# Patient Record
Sex: MALE | Race: WHITE | Employment: OTHER | URBAN - METROPOLITAN AREA
[De-identification: names, ages, dates, MRNs, and addresses within clinical notes are randomized per-mention and may not be internally consistent; named-entity substitution may affect disease eponyms.]

---

## 2018-03-22 ENCOUNTER — HOSPITAL ENCOUNTER (OUTPATIENT)
Dept: PREADMISSION TESTING | Age: 73
Discharge: HOME | End: 2018-03-22
Payer: COMMERCIAL

## 2018-03-22 VITALS — WEIGHT: 220 LBS | BODY MASS INDEX: 36.65 KG/M2 | HEIGHT: 65 IN

## 2018-03-22 LAB
ALBUMIN SERPL-MCNC: 3.8 G/DL (ref 3.4–5)
ALBUMIN/GLOB SERPL: 1.3 {RATIO} (ref 0.8–1.7)
ALP SERPL-CCNC: 54 U/L (ref 45–117)
ALT SERPL-CCNC: 24 U/L (ref 16–61)
ANION GAP SERPL CALC-SCNC: 12 MMOL/L (ref 3–18)
APPEARANCE UR: CLEAR
APTT PPP: 28.4 SEC (ref 23–36.4)
AST SERPL-CCNC: 17 U/L (ref 15–37)
BACTERIA SPEC CULT: NORMAL
BACTERIA URNS QL MICRO: ABNORMAL /HPF
BASOPHILS # BLD: 0.1 K/UL (ref 0–0.06)
BASOPHILS NFR BLD: 1 % (ref 0–2)
BILIRUB SERPL-MCNC: 0.8 MG/DL (ref 0.2–1)
BILIRUB UR QL: NEGATIVE
BUN SERPL-MCNC: 16 MG/DL (ref 7–18)
BUN/CREAT SERPL: 15 (ref 12–20)
CALCIUM SERPL-MCNC: 9 MG/DL (ref 8.5–10.1)
CHLORIDE SERPL-SCNC: 107 MMOL/L (ref 100–108)
CO2 SERPL-SCNC: 23 MMOL/L (ref 21–32)
COLOR UR: YELLOW
CREAT SERPL-MCNC: 1.07 MG/DL (ref 0.6–1.3)
CRP SERPL-MCNC: <0.3 MG/DL (ref 0–0.3)
DIFFERENTIAL METHOD BLD: ABNORMAL
EOSINOPHIL # BLD: 0.3 K/UL (ref 0–0.4)
EOSINOPHIL NFR BLD: 5 % (ref 0–5)
EPITH CASTS URNS QL MICRO: NEGATIVE /LPF (ref 0–5)
ERYTHROCYTE [DISTWIDTH] IN BLOOD BY AUTOMATED COUNT: 13.5 % (ref 11.6–14.5)
ERYTHROCYTE [SEDIMENTATION RATE] IN BLOOD: 4 MM/HR (ref 0–20)
EST. AVERAGE GLUCOSE BLD GHB EST-MCNC: 120 MG/DL
GLOBULIN SER CALC-MCNC: 3 G/DL (ref 2–4)
GLUCOSE SERPL-MCNC: 75 MG/DL (ref 74–99)
GLUCOSE UR STRIP.AUTO-MCNC: NEGATIVE MG/DL
HBA1C MFR BLD: 5.8 % (ref 4.5–5.6)
HCT VFR BLD AUTO: 44.3 % (ref 36–48)
HGB BLD-MCNC: 14.8 G/DL (ref 13–16)
HGB UR QL STRIP: ABNORMAL
INR PPP: 1 (ref 0.8–1.2)
KETONES UR QL STRIP.AUTO: NEGATIVE MG/DL
LEUKOCYTE ESTERASE UR QL STRIP.AUTO: NEGATIVE
LYMPHOCYTES # BLD: 1.8 K/UL (ref 0.9–3.6)
LYMPHOCYTES NFR BLD: 27 % (ref 21–52)
MCH RBC QN AUTO: 30.3 PG (ref 24–34)
MCHC RBC AUTO-ENTMCNC: 33.4 G/DL (ref 31–37)
MCV RBC AUTO: 90.8 FL (ref 74–97)
MONOCYTES # BLD: 0.6 K/UL (ref 0.05–1.2)
MONOCYTES NFR BLD: 9 % (ref 3–10)
NEUTS SEG # BLD: 3.8 K/UL (ref 1.8–8)
NEUTS SEG NFR BLD: 58 % (ref 40–73)
NITRITE UR QL STRIP.AUTO: NEGATIVE
PH UR STRIP: 5.5 [PH] (ref 5–8)
PLATELET # BLD AUTO: 190 K/UL (ref 135–420)
PMV BLD AUTO: 11.6 FL (ref 9.2–11.8)
POTASSIUM SERPL-SCNC: 4.2 MMOL/L (ref 3.5–5.5)
PROT SERPL-MCNC: 6.8 G/DL (ref 6.4–8.2)
PROT UR STRIP-MCNC: NEGATIVE MG/DL
PROTHROMBIN TIME: 12.8 SEC (ref 11.5–15.2)
RBC # BLD AUTO: 4.88 M/UL (ref 4.7–5.5)
RBC #/AREA URNS HPF: ABNORMAL /HPF (ref 0–5)
SERVICE CMNT-IMP: NORMAL
SODIUM SERPL-SCNC: 142 MMOL/L (ref 136–145)
SP GR UR REFRACTOMETRY: 1.02 (ref 1–1.03)
UROBILINOGEN UR QL STRIP.AUTO: 0.2 EU/DL (ref 0.2–1)
WBC # BLD AUTO: 6.6 K/UL (ref 4.6–13.2)
WBC URNS QL MICRO: NEGATIVE /HPF (ref 0–5)

## 2018-03-22 PROCEDURE — 85025 COMPLETE CBC W/AUTO DIFF WBC: CPT | Performed by: ORTHOPAEDIC SURGERY

## 2018-03-22 PROCEDURE — 85652 RBC SED RATE AUTOMATED: CPT | Performed by: ORTHOPAEDIC SURGERY

## 2018-03-22 PROCEDURE — 83036 HEMOGLOBIN GLYCOSYLATED A1C: CPT | Performed by: ORTHOPAEDIC SURGERY

## 2018-03-22 PROCEDURE — 85610 PROTHROMBIN TIME: CPT | Performed by: ORTHOPAEDIC SURGERY

## 2018-03-22 PROCEDURE — 93005 ELECTROCARDIOGRAM TRACING: CPT

## 2018-03-22 PROCEDURE — 81001 URINALYSIS AUTO W/SCOPE: CPT | Performed by: ORTHOPAEDIC SURGERY

## 2018-03-22 PROCEDURE — 86140 C-REACTIVE PROTEIN: CPT | Performed by: ORTHOPAEDIC SURGERY

## 2018-03-22 PROCEDURE — 80053 COMPREHEN METABOLIC PANEL: CPT | Performed by: ORTHOPAEDIC SURGERY

## 2018-03-22 PROCEDURE — 85730 THROMBOPLASTIN TIME PARTIAL: CPT | Performed by: ORTHOPAEDIC SURGERY

## 2018-03-22 PROCEDURE — 87641 MR-STAPH DNA AMP PROBE: CPT | Performed by: ORTHOPAEDIC SURGERY

## 2018-03-22 RX ORDER — GLUCOSAMINE SULFATE 1500 MG
5000 POWDER IN PACKET (EA) ORAL DAILY
COMMUNITY
End: 2018-06-05

## 2018-03-22 RX ORDER — ASPIRIN 81 MG/1
81 TABLET ORAL DAILY
COMMUNITY
End: 2018-06-28

## 2018-03-22 RX ORDER — LOVASTATIN 20 MG/1
20 TABLET ORAL
COMMUNITY

## 2018-03-22 RX ORDER — SODIUM CHLORIDE, SODIUM LACTATE, POTASSIUM CHLORIDE, CALCIUM CHLORIDE 600; 310; 30; 20 MG/100ML; MG/100ML; MG/100ML; MG/100ML
125 INJECTION, SOLUTION INTRAVENOUS CONTINUOUS
Status: CANCELLED | OUTPATIENT
Start: 2018-03-22

## 2018-03-22 RX ORDER — CEFAZOLIN SODIUM 2 G/50ML
2 SOLUTION INTRAVENOUS ONCE
Status: CANCELLED | OUTPATIENT
Start: 2018-03-22 | End: 2018-03-22

## 2018-03-22 NOTE — PERIOP NOTES
Denies sleep apnea or history of MH does not meet criteria for special population paula prep reviewed ,Type and screen on admit

## 2018-03-23 LAB
ATRIAL RATE: 44 BPM
CALCULATED P AXIS, ECG09: 44 DEGREES
CALCULATED R AXIS, ECG10: 25 DEGREES
CALCULATED T AXIS, ECG11: 108 DEGREES
DIAGNOSIS, 93000: NORMAL
P-R INTERVAL, ECG05: 174 MS
Q-T INTERVAL, ECG07: 448 MS
QRS DURATION, ECG06: 90 MS
QTC CALCULATION (BEZET), ECG08: 383 MS
VENTRICULAR RATE, ECG03: 44 BPM

## 2018-04-04 NOTE — PERIOP NOTES
Called Dr. Ivan Martínez office for update to visit on 4/2/18. Patient will need further testing. Currently scheduled for April 23: echo and nuclear stress test; April 30th F/U for results. Notified Dr. Saritha Gould office of need for further testing in order to be cleared. Patient will be rescheduled.

## 2018-06-05 ENCOUNTER — HOSPITAL ENCOUNTER (OUTPATIENT)
Dept: PREADMISSION TESTING | Age: 73
Discharge: HOME | End: 2018-06-05
Payer: COMMERCIAL

## 2018-06-05 VITALS — HEIGHT: 65 IN | WEIGHT: 210.13 LBS | BODY MASS INDEX: 35.01 KG/M2

## 2018-06-05 LAB
ALBUMIN SERPL-MCNC: 4.2 G/DL (ref 3.4–5)
ALBUMIN/GLOB SERPL: 1.4 {RATIO} (ref 0.8–1.7)
ALP SERPL-CCNC: 59 U/L (ref 45–117)
ALT SERPL-CCNC: 24 U/L (ref 16–61)
ANION GAP SERPL CALC-SCNC: 11 MMOL/L (ref 3–18)
APPEARANCE UR: CLEAR
APTT PPP: 25.6 SEC (ref 23–36.4)
AST SERPL-CCNC: 15 U/L (ref 15–37)
ATRIAL RATE: 44 BPM
BACTERIA SPEC CULT: NORMAL
BACTERIA URNS QL MICRO: ABNORMAL /HPF
BASOPHILS # BLD: 0.1 K/UL (ref 0–0.06)
BASOPHILS NFR BLD: 1 % (ref 0–2)
BILIRUB SERPL-MCNC: 1.4 MG/DL (ref 0.2–1)
BILIRUB UR QL: NEGATIVE
BUN SERPL-MCNC: 13 MG/DL (ref 7–18)
BUN/CREAT SERPL: 14 (ref 12–20)
CALCIUM SERPL-MCNC: 8.9 MG/DL (ref 8.5–10.1)
CALCULATED P AXIS, ECG09: 73 DEGREES
CALCULATED R AXIS, ECG10: 34 DEGREES
CALCULATED T AXIS, ECG11: 82 DEGREES
CHLORIDE SERPL-SCNC: 107 MMOL/L (ref 100–108)
CO2 SERPL-SCNC: 25 MMOL/L (ref 21–32)
COLOR UR: YELLOW
CREAT SERPL-MCNC: 0.96 MG/DL (ref 0.6–1.3)
DIAGNOSIS, 93000: NORMAL
DIFFERENTIAL METHOD BLD: ABNORMAL
EOSINOPHIL # BLD: 0.3 K/UL (ref 0–0.4)
EOSINOPHIL NFR BLD: 4 % (ref 0–5)
EPITH CASTS URNS QL MICRO: ABNORMAL /LPF (ref 0–5)
ERYTHROCYTE [DISTWIDTH] IN BLOOD BY AUTOMATED COUNT: 13.3 % (ref 11.6–14.5)
ERYTHROCYTE [SEDIMENTATION RATE] IN BLOOD: 5 MM/HR (ref 0–20)
EST. AVERAGE GLUCOSE BLD GHB EST-MCNC: 111 MG/DL
GLOBULIN SER CALC-MCNC: 3 G/DL (ref 2–4)
GLUCOSE SERPL-MCNC: 106 MG/DL (ref 74–99)
GLUCOSE UR STRIP.AUTO-MCNC: NEGATIVE MG/DL
HBA1C MFR BLD: 5.5 % (ref 4.5–5.6)
HCT VFR BLD AUTO: 44.1 % (ref 36–48)
HGB BLD-MCNC: 14.5 G/DL (ref 13–16)
HGB UR QL STRIP: ABNORMAL
INR PPP: 1 (ref 0.8–1.2)
KETONES UR QL STRIP.AUTO: NEGATIVE MG/DL
LEUKOCYTE ESTERASE UR QL STRIP.AUTO: NEGATIVE
LYMPHOCYTES # BLD: 1.8 K/UL (ref 0.9–3.6)
LYMPHOCYTES NFR BLD: 30 % (ref 21–52)
MCH RBC QN AUTO: 30 PG (ref 24–34)
MCHC RBC AUTO-ENTMCNC: 32.9 G/DL (ref 31–37)
MCV RBC AUTO: 91.1 FL (ref 74–97)
MONOCYTES # BLD: 0.4 K/UL (ref 0.05–1.2)
MONOCYTES NFR BLD: 7 % (ref 3–10)
NEUTS SEG # BLD: 3.5 K/UL (ref 1.8–8)
NEUTS SEG NFR BLD: 58 % (ref 40–73)
NITRITE UR QL STRIP.AUTO: NEGATIVE
P-R INTERVAL, ECG05: 210 MS
PH UR STRIP: 5.5 [PH] (ref 5–8)
PLATELET # BLD AUTO: 196 K/UL (ref 135–420)
PMV BLD AUTO: 11.1 FL (ref 9.2–11.8)
POTASSIUM SERPL-SCNC: 4.3 MMOL/L (ref 3.5–5.5)
PROT SERPL-MCNC: 7.2 G/DL (ref 6.4–8.2)
PROT UR STRIP-MCNC: NEGATIVE MG/DL
PROTHROMBIN TIME: 12.7 SEC (ref 11.5–15.2)
Q-T INTERVAL, ECG07: 470 MS
QRS DURATION, ECG06: 90 MS
QTC CALCULATION (BEZET), ECG08: 401 MS
RBC # BLD AUTO: 4.84 M/UL (ref 4.7–5.5)
RBC #/AREA URNS HPF: ABNORMAL /HPF (ref 0–5)
RPR SER QL: NONREACTIVE
SERVICE CMNT-IMP: NORMAL
SODIUM SERPL-SCNC: 143 MMOL/L (ref 136–145)
SP GR UR REFRACTOMETRY: 1.01 (ref 1–1.03)
UROBILINOGEN UR QL STRIP.AUTO: 0.2 EU/DL (ref 0.2–1)
VENTRICULAR RATE, ECG03: 44 BPM
WBC # BLD AUTO: 6 K/UL (ref 4.6–13.2)
WBC URNS QL MICRO: ABNORMAL /HPF (ref 0–5)

## 2018-06-05 PROCEDURE — 85652 RBC SED RATE AUTOMATED: CPT | Performed by: ORTHOPAEDIC SURGERY

## 2018-06-05 PROCEDURE — 86592 SYPHILIS TEST NON-TREP QUAL: CPT | Performed by: ORTHOPAEDIC SURGERY

## 2018-06-05 PROCEDURE — 85610 PROTHROMBIN TIME: CPT | Performed by: ORTHOPAEDIC SURGERY

## 2018-06-05 PROCEDURE — 83036 HEMOGLOBIN GLYCOSYLATED A1C: CPT | Performed by: ORTHOPAEDIC SURGERY

## 2018-06-05 PROCEDURE — 85730 THROMBOPLASTIN TIME PARTIAL: CPT | Performed by: ORTHOPAEDIC SURGERY

## 2018-06-05 PROCEDURE — 80053 COMPREHEN METABOLIC PANEL: CPT | Performed by: ORTHOPAEDIC SURGERY

## 2018-06-05 PROCEDURE — 85025 COMPLETE CBC W/AUTO DIFF WBC: CPT | Performed by: ORTHOPAEDIC SURGERY

## 2018-06-05 PROCEDURE — 81001 URINALYSIS AUTO W/SCOPE: CPT | Performed by: ORTHOPAEDIC SURGERY

## 2018-06-05 PROCEDURE — 87641 MR-STAPH DNA AMP PROBE: CPT | Performed by: ORTHOPAEDIC SURGERY

## 2018-06-05 PROCEDURE — 86140 C-REACTIVE PROTEIN: CPT | Performed by: ORTHOPAEDIC SURGERY

## 2018-06-05 PROCEDURE — 93005 ELECTROCARDIOGRAM TRACING: CPT

## 2018-06-05 RX ORDER — CEFAZOLIN SODIUM/WATER 2 G/20 ML
2 SYRINGE (ML) INTRAVENOUS ONCE
Status: CANCELLED | OUTPATIENT
Start: 2018-06-05 | End: 2018-06-05

## 2018-06-05 RX ORDER — ASPIRIN 325 MG
TABLET, DELAYED RELEASE (ENTERIC COATED) ORAL
COMMUNITY

## 2018-06-05 RX ORDER — SODIUM CHLORIDE, SODIUM LACTATE, POTASSIUM CHLORIDE, CALCIUM CHLORIDE 600; 310; 30; 20 MG/100ML; MG/100ML; MG/100ML; MG/100ML
125 INJECTION, SOLUTION INTRAVENOUS CONTINUOUS
Status: CANCELLED | OUTPATIENT
Start: 2018-06-05

## 2018-06-06 LAB — CRP SERPL-MCNC: <0.3 MG/DL (ref 0–0.3)

## 2018-06-25 NOTE — H&P
[address] [facility] [address]  History and Physical Exam    Patient: Liset Chester MRN: [id number]  SSN: [id number]    YOB: 1945  Age: 67 y.o. Sex: male      Subjective:      Chief Complaint: Right knee pain    History of Present Illness:  Patient complains of knee pain on the affected side and difficulty ambulating. Pain is increased with increasing activity. Pain is increased with weight bearing. Pain interferes with activities of daily living. Patient has noticed decreased range of motion. Past Medical History:   Diagnosis Date    Chronic pain     right knee     Hypercholesteremia      Past Surgical History:   Procedure Laterality Date    HX HEENT  2006    right ear drum    HX ORTHOPAEDIC  1950    right knee above knee insertion site/indentation; childhood     Social History     Occupational History    Not on file. Social History Main Topics    Smoking status: Current Every Day Smoker     Packs/day: 0.25     Years: 40.00    Smokeless tobacco: Never Used      Comment: instructed not to smoke 24 hrs prior surgery    Alcohol use No    Drug use: No    Sexual activity: Not on file     Prior to Admission medications    Medication Sig Start Date End Date Taking? Authorizing Provider   cholecalciferol (VITAMIN D3) 50,000 unit capsule Take  by mouth every Monday. Historical Provider   lovastatin (MEVACOR) 20 mg tablet Take 20 mg by mouth nightly. Historical Provider   aspirin delayed-release 81 mg tablet Take 81 mg by mouth daily. Historical Provider       Family History Non-contributory. Allergies: No Known Allergies     Review of Systems:  A comprehensive review of systems was negative.     Objective:       Physical Exam:  General:  Alert, oriented, pleasant, well-groomed  HEENT:  Normocephalic, atraumatic  Lungs:   Clear to auscultation  Heart:    Regular rate and rhythm  Abdomen:  Soft, non-tender  Extremities:   Pain with range of motion of the affected knee    ROM: decreased    Crepitus with motion of affected knee    Mild effusion       Xrays:   Xrays demonstrate severe arthritic changes including sunchondral cysts, subchondral sclerosis, periarticular osteophytes, and joint space narrowing. Assessment:      Arthritis of the affected knee. This has significantly affected the patient's quality of life. It interferes with activities of daily living. It is worse with weight bearing and activity. Plan:       Proceed with scheduled right total knee. The patient has failed conservative measures including anti-inflammatories, injections, activity modification, and a trial of physical therapy or external joint support which includes a home exercise program.    The various methods of treatment have been discussed with the patient and family. After consideration of risks, benefits, and other options for treatment, the patient has consented to surgical interventions. Questions were answered and preoperative teaching was done by Dr. Saji Morrow.      Signed By: Katelynn George PA-C     June 25, 2018

## 2018-06-27 ENCOUNTER — ANESTHESIA (OUTPATIENT)
Dept: SURGERY | Age: 73
DRG: 470 | End: 2018-06-27
Payer: COMMERCIAL

## 2018-06-27 ENCOUNTER — HOSPITAL ENCOUNTER (INPATIENT)
Age: 73
LOS: 1 days | Discharge: HOME | DRG: 470 | End: 2018-06-28
Attending: ORTHOPAEDIC SURGERY | Admitting: ORTHOPAEDIC SURGERY
Payer: COMMERCIAL

## 2018-06-27 ENCOUNTER — APPOINTMENT (OUTPATIENT)
Dept: GENERAL RADIOLOGY | Age: 73
DRG: 470 | End: 2018-06-27
Attending: ORTHOPAEDIC SURGERY
Payer: COMMERCIAL

## 2018-06-27 ENCOUNTER — ANESTHESIA EVENT (OUTPATIENT)
Dept: SURGERY | Age: 73
DRG: 470 | End: 2018-06-27
Payer: COMMERCIAL

## 2018-06-27 DIAGNOSIS — M17.11 PRIMARY OSTEOARTHRITIS OF RIGHT KNEE: Primary | ICD-10-CM

## 2018-06-27 PROBLEM — E66.9 OBESITY: Status: ACTIVE | Noted: 2017-11-27

## 2018-06-27 PROBLEM — M21.961 ACQUIRED DEFORMITY OF RIGHT KNEE: Status: ACTIVE | Noted: 2017-11-27

## 2018-06-27 PROBLEM — E78.00 HYPERCHOLESTEREMIA: Status: ACTIVE | Noted: 2017-11-27

## 2018-06-27 PROBLEM — M17.9 KNEE OSTEOARTHRITIS: Status: RESOLVED | Noted: 2018-06-27 | Resolved: 2018-06-27

## 2018-06-27 PROBLEM — M17.9 KNEE OSTEOARTHRITIS: Status: ACTIVE | Noted: 2018-06-27

## 2018-06-27 PROBLEM — M21.70 ACQUIRED LEG LENGTH DISCREPANCY: Status: ACTIVE | Noted: 2017-11-27

## 2018-06-27 PROBLEM — G89.29 CHRONIC PAIN OF RIGHT KNEE: Status: ACTIVE | Noted: 2017-11-27

## 2018-06-27 PROBLEM — M25.561 CHRONIC PAIN OF RIGHT KNEE: Status: ACTIVE | Noted: 2017-11-27

## 2018-06-27 LAB
ABO + RH BLD: NORMAL
BLOOD GROUP ANTIBODIES SERPL: NORMAL
SPECIMEN EXP DATE BLD: NORMAL

## 2018-06-27 PROCEDURE — C1776 JOINT DEVICE (IMPLANTABLE): HCPCS | Performed by: ORTHOPAEDIC SURGERY

## 2018-06-27 PROCEDURE — 74011250637 HC RX REV CODE- 250/637: Performed by: ANESTHESIOLOGY

## 2018-06-27 PROCEDURE — 77030012508 HC MSK AIRWY LMA AMBU -A: Performed by: ANESTHESIOLOGY

## 2018-06-27 PROCEDURE — 64450 NJX AA&/STRD OTHER PN/BRANCH: CPT | Performed by: ORTHOPAEDIC SURGERY

## 2018-06-27 PROCEDURE — 77030037875 HC DRSG MEPILEX <16IN BORD MOLN -A: Performed by: ORTHOPAEDIC SURGERY

## 2018-06-27 PROCEDURE — 77030032489 HC SLV COMPR SCD FT CUF COVD -B: Performed by: ORTHOPAEDIC SURGERY

## 2018-06-27 PROCEDURE — 77030035643 HC BLD SAW OSC PRECIS STRY -C: Performed by: ORTHOPAEDIC SURGERY

## 2018-06-27 PROCEDURE — 73560 X-RAY EXAM OF KNEE 1 OR 2: CPT

## 2018-06-27 PROCEDURE — 97161 PT EVAL LOW COMPLEX 20 MIN: CPT

## 2018-06-27 PROCEDURE — 74011000258 HC RX REV CODE- 258: Performed by: ORTHOPAEDIC SURGERY

## 2018-06-27 PROCEDURE — 77030031139 HC SUT VCRL2 J&J -A: Performed by: ORTHOPAEDIC SURGERY

## 2018-06-27 PROCEDURE — 76210000006 HC OR PH I REC 0.5 TO 1 HR: Performed by: ORTHOPAEDIC SURGERY

## 2018-06-27 PROCEDURE — 74011000258 HC RX REV CODE- 258: Performed by: PHYSICIAN ASSISTANT

## 2018-06-27 PROCEDURE — 77030018835 HC SOL IRR LR ICUM -A: Performed by: ORTHOPAEDIC SURGERY

## 2018-06-27 PROCEDURE — 74011000258 HC RX REV CODE- 258

## 2018-06-27 PROCEDURE — 74011000250 HC RX REV CODE- 250: Performed by: ORTHOPAEDIC SURGERY

## 2018-06-27 PROCEDURE — C1713 ANCHOR/SCREW BN/BN,TIS/BN: HCPCS | Performed by: ORTHOPAEDIC SURGERY

## 2018-06-27 PROCEDURE — 77030018836 HC SOL IRR NACL ICUM -A: Performed by: ORTHOPAEDIC SURGERY

## 2018-06-27 PROCEDURE — 77030036563 HC WRP CLD THER KNE S2SG -B: Performed by: ORTHOPAEDIC SURGERY

## 2018-06-27 PROCEDURE — 77030011640 HC PAD GRND REM COVD -A: Performed by: ORTHOPAEDIC SURGERY

## 2018-06-27 PROCEDURE — 74011250636 HC RX REV CODE- 250/636: Performed by: ORTHOPAEDIC SURGERY

## 2018-06-27 PROCEDURE — 65270000029 HC RM PRIVATE

## 2018-06-27 PROCEDURE — 36415 COLL VENOUS BLD VENIPUNCTURE: CPT | Performed by: ORTHOPAEDIC SURGERY

## 2018-06-27 PROCEDURE — 86900 BLOOD TYPING SEROLOGIC ABO: CPT | Performed by: ORTHOPAEDIC SURGERY

## 2018-06-27 PROCEDURE — 97116 GAIT TRAINING THERAPY: CPT

## 2018-06-27 PROCEDURE — 74011250636 HC RX REV CODE- 250/636

## 2018-06-27 PROCEDURE — 74011000250 HC RX REV CODE- 250

## 2018-06-27 PROCEDURE — 74011250636 HC RX REV CODE- 250/636: Performed by: ANESTHESIOLOGY

## 2018-06-27 PROCEDURE — 0SRC0JA REPLACEMENT OF RIGHT KNEE JOINT WITH SYNTHETIC SUBSTITUTE, UNCEMENTED, OPEN APPROACH: ICD-10-PCS | Performed by: ORTHOPAEDIC SURGERY

## 2018-06-27 PROCEDURE — 77030020754 HC CUF TRNQT 2BLA STRY -B: Performed by: ORTHOPAEDIC SURGERY

## 2018-06-27 PROCEDURE — 77030013708 HC HNDPC SUC IRR PULS STRY –B: Performed by: ORTHOPAEDIC SURGERY

## 2018-06-27 PROCEDURE — 77030002933 HC SUT MCRYL J&J -A: Performed by: ORTHOPAEDIC SURGERY

## 2018-06-27 PROCEDURE — 77030018846 HC SOL IRR STRL H20 ICUM -A: Performed by: ORTHOPAEDIC SURGERY

## 2018-06-27 PROCEDURE — 76942 ECHO GUIDE FOR BIOPSY: CPT | Performed by: ORTHOPAEDIC SURGERY

## 2018-06-27 PROCEDURE — 76060000034 HC ANESTHESIA 1.5 TO 2 HR: Performed by: ORTHOPAEDIC SURGERY

## 2018-06-27 PROCEDURE — 77030039267 HC ADH SKN EXOFIN S2SG -B: Performed by: ORTHOPAEDIC SURGERY

## 2018-06-27 PROCEDURE — 74011000250 HC RX REV CODE- 250: Performed by: PHYSICIAN ASSISTANT

## 2018-06-27 PROCEDURE — C9290 INJ, BUPIVACAINE LIPOSOME: HCPCS | Performed by: ORTHOPAEDIC SURGERY

## 2018-06-27 PROCEDURE — 76010000153 HC OR TIME 1.5 TO 2 HR: Performed by: ORTHOPAEDIC SURGERY

## 2018-06-27 DEVICE — COMPONENT TOT KNEE HYBRID POROUS X3 TRIATHLON: Type: IMPLANTABLE DEVICE | Site: KNEE | Status: FUNCTIONAL

## 2018-06-27 DEVICE — TIBIAL BEARING INSERT - CR
Type: IMPLANTABLE DEVICE | Site: KNEE | Status: FUNCTIONAL
Brand: TRIATHLON

## 2018-06-27 DEVICE — TIBIAL COMPONENT
Type: IMPLANTABLE DEVICE | Site: KNEE | Status: FUNCTIONAL
Brand: TRIATHLON

## 2018-06-27 DEVICE — PATELLA
Type: IMPLANTABLE DEVICE | Site: KNEE | Status: FUNCTIONAL
Brand: TRIATHLON

## 2018-06-27 DEVICE — CRUCIATE RETAINING FEMORAL
Type: IMPLANTABLE DEVICE | Site: KNEE | Status: FUNCTIONAL
Brand: TRIATHLON

## 2018-06-27 RX ORDER — ONDANSETRON 2 MG/ML
INJECTION INTRAMUSCULAR; INTRAVENOUS AS NEEDED
Status: DISCONTINUED | OUTPATIENT
Start: 2018-06-27 | End: 2018-06-27 | Stop reason: HOSPADM

## 2018-06-27 RX ORDER — PREGABALIN 50 MG/1
50 CAPSULE ORAL
Status: COMPLETED | OUTPATIENT
Start: 2018-06-27 | End: 2018-06-27

## 2018-06-27 RX ORDER — NALOXONE HYDROCHLORIDE 0.4 MG/ML
0.4 INJECTION, SOLUTION INTRAMUSCULAR; INTRAVENOUS; SUBCUTANEOUS AS NEEDED
Status: DISCONTINUED | OUTPATIENT
Start: 2018-06-27 | End: 2018-06-28 | Stop reason: HOSPADM

## 2018-06-27 RX ORDER — DEXMEDETOMIDINE HYDROCHLORIDE 4 UG/ML
INJECTION, SOLUTION INTRAVENOUS AS NEEDED
Status: DISCONTINUED | OUTPATIENT
Start: 2018-06-27 | End: 2018-06-27 | Stop reason: HOSPADM

## 2018-06-27 RX ORDER — PROPOFOL 10 MG/ML
INJECTION, EMULSION INTRAVENOUS AS NEEDED
Status: DISCONTINUED | OUTPATIENT
Start: 2018-06-27 | End: 2018-06-27 | Stop reason: HOSPADM

## 2018-06-27 RX ORDER — SODIUM CHLORIDE, SODIUM LACTATE, POTASSIUM CHLORIDE, CALCIUM CHLORIDE 600; 310; 30; 20 MG/100ML; MG/100ML; MG/100ML; MG/100ML
125 INJECTION, SOLUTION INTRAVENOUS CONTINUOUS
Status: DISCONTINUED | OUTPATIENT
Start: 2018-06-27 | End: 2018-06-28 | Stop reason: HOSPADM

## 2018-06-27 RX ORDER — GLYCOPYRROLATE 0.2 MG/ML
INJECTION INTRAMUSCULAR; INTRAVENOUS AS NEEDED
Status: DISCONTINUED | OUTPATIENT
Start: 2018-06-27 | End: 2018-06-27 | Stop reason: HOSPADM

## 2018-06-27 RX ORDER — CEFAZOLIN SODIUM/WATER 2 G/20 ML
2 SYRINGE (ML) INTRAVENOUS EVERY 8 HOURS
Status: COMPLETED | OUTPATIENT
Start: 2018-06-27 | End: 2018-06-28

## 2018-06-27 RX ORDER — HYDROMORPHONE HYDROCHLORIDE 1 MG/ML
1 INJECTION, SOLUTION INTRAMUSCULAR; INTRAVENOUS; SUBCUTANEOUS
Status: DISCONTINUED | OUTPATIENT
Start: 2018-06-27 | End: 2018-06-27 | Stop reason: RX

## 2018-06-27 RX ORDER — OXYCODONE HYDROCHLORIDE 5 MG/1
5 TABLET ORAL ONCE
Qty: 50 TAB | Refills: 0 | OUTPATIENT
Start: 2018-06-27 | End: 2018-06-27

## 2018-06-27 RX ORDER — NALOXONE HYDROCHLORIDE 0.4 MG/ML
0.1 INJECTION, SOLUTION INTRAMUSCULAR; INTRAVENOUS; SUBCUTANEOUS AS NEEDED
Status: DISCONTINUED | OUTPATIENT
Start: 2018-06-27 | End: 2018-06-27 | Stop reason: HOSPADM

## 2018-06-27 RX ORDER — CELECOXIB 100 MG/1
200 CAPSULE ORAL
Status: COMPLETED | OUTPATIENT
Start: 2018-06-27 | End: 2018-06-27

## 2018-06-27 RX ORDER — ROPIVACAINE HYDROCHLORIDE 5 MG/ML
INJECTION, SOLUTION EPIDURAL; INFILTRATION; PERINEURAL AS NEEDED
Status: DISCONTINUED | OUTPATIENT
Start: 2018-06-27 | End: 2018-06-27 | Stop reason: HOSPADM

## 2018-06-27 RX ORDER — LIDOCAINE HYDROCHLORIDE 20 MG/ML
INJECTION, SOLUTION EPIDURAL; INFILTRATION; INTRACAUDAL; PERINEURAL AS NEEDED
Status: DISCONTINUED | OUTPATIENT
Start: 2018-06-27 | End: 2018-06-27 | Stop reason: HOSPADM

## 2018-06-27 RX ORDER — ALBUTEROL SULFATE 0.83 MG/ML
2.5 SOLUTION RESPIRATORY (INHALATION) AS NEEDED
Status: DISCONTINUED | OUTPATIENT
Start: 2018-06-27 | End: 2018-06-27 | Stop reason: HOSPADM

## 2018-06-27 RX ORDER — OXYCODONE HYDROCHLORIDE 5 MG/1
5 TABLET ORAL ONCE
Status: DISCONTINUED | OUTPATIENT
Start: 2018-06-27 | End: 2018-06-27

## 2018-06-27 RX ORDER — DIPHENHYDRAMINE HYDROCHLORIDE 50 MG/ML
12.5 INJECTION, SOLUTION INTRAMUSCULAR; INTRAVENOUS
Status: DISCONTINUED | OUTPATIENT
Start: 2018-06-27 | End: 2018-06-27 | Stop reason: HOSPADM

## 2018-06-27 RX ORDER — ACETAMINOPHEN 500 MG
1000 TABLET ORAL ONCE
Status: COMPLETED | OUTPATIENT
Start: 2018-06-27 | End: 2018-06-27

## 2018-06-27 RX ORDER — SODIUM CHLORIDE 0.9 % (FLUSH) 0.9 %
5-10 SYRINGE (ML) INJECTION AS NEEDED
Status: DISCONTINUED | OUTPATIENT
Start: 2018-06-27 | End: 2018-06-28 | Stop reason: HOSPADM

## 2018-06-27 RX ORDER — LOVASTATIN 20 MG/1
20 TABLET ORAL
Status: DISCONTINUED | OUTPATIENT
Start: 2018-06-27 | End: 2018-06-28 | Stop reason: HOSPADM

## 2018-06-27 RX ORDER — DEXAMETHASONE SODIUM PHOSPHATE 4 MG/ML
INJECTION, SOLUTION INTRA-ARTICULAR; INTRALESIONAL; INTRAMUSCULAR; INTRAVENOUS; SOFT TISSUE AS NEEDED
Status: DISCONTINUED | OUTPATIENT
Start: 2018-06-27 | End: 2018-06-27 | Stop reason: HOSPADM

## 2018-06-27 RX ORDER — SODIUM CHLORIDE, SODIUM LACTATE, POTASSIUM CHLORIDE, CALCIUM CHLORIDE 600; 310; 30; 20 MG/100ML; MG/100ML; MG/100ML; MG/100ML
150 INJECTION, SOLUTION INTRAVENOUS CONTINUOUS
Status: DISCONTINUED | OUTPATIENT
Start: 2018-06-27 | End: 2018-06-27 | Stop reason: HOSPADM

## 2018-06-27 RX ORDER — SODIUM CHLORIDE 0.9 % (FLUSH) 0.9 %
5-10 SYRINGE (ML) INJECTION AS NEEDED
Status: DISCONTINUED | OUTPATIENT
Start: 2018-06-27 | End: 2018-06-27 | Stop reason: HOSPADM

## 2018-06-27 RX ORDER — FENTANYL CITRATE 50 UG/ML
25 INJECTION, SOLUTION INTRAMUSCULAR; INTRAVENOUS AS NEEDED
Status: DISCONTINUED | OUTPATIENT
Start: 2018-06-27 | End: 2018-06-27 | Stop reason: HOSPADM

## 2018-06-27 RX ORDER — DIPHENHYDRAMINE HYDROCHLORIDE 50 MG/ML
12.5 INJECTION, SOLUTION INTRAMUSCULAR; INTRAVENOUS
Status: DISCONTINUED | OUTPATIENT
Start: 2018-06-27 | End: 2018-06-28 | Stop reason: HOSPADM

## 2018-06-27 RX ORDER — MAGNESIUM SULFATE 100 %
4 CRYSTALS MISCELLANEOUS AS NEEDED
Status: DISCONTINUED | OUTPATIENT
Start: 2018-06-27 | End: 2018-06-27 | Stop reason: HOSPADM

## 2018-06-27 RX ORDER — OXYCODONE HYDROCHLORIDE 5 MG/1
5 TABLET ORAL ONCE
Status: DISCONTINUED | OUTPATIENT
Start: 2018-06-27 | End: 2018-06-27 | Stop reason: HOSPADM

## 2018-06-27 RX ORDER — SODIUM CHLORIDE 0.9 % (FLUSH) 0.9 %
5-10 SYRINGE (ML) INJECTION EVERY 8 HOURS
Status: DISCONTINUED | OUTPATIENT
Start: 2018-06-27 | End: 2018-06-28 | Stop reason: HOSPADM

## 2018-06-27 RX ORDER — OXYCODONE AND ACETAMINOPHEN 5; 325 MG/1; MG/1
1 TABLET ORAL
Status: DISCONTINUED | OUTPATIENT
Start: 2018-06-27 | End: 2018-06-28 | Stop reason: HOSPADM

## 2018-06-27 RX ORDER — INSULIN LISPRO 100 [IU]/ML
INJECTION, SOLUTION INTRAVENOUS; SUBCUTANEOUS ONCE
Status: DISCONTINUED | OUTPATIENT
Start: 2018-06-27 | End: 2018-06-27 | Stop reason: HOSPADM

## 2018-06-27 RX ORDER — HYDROMORPHONE HYDROCHLORIDE 2 MG/ML
1 INJECTION, SOLUTION INTRAMUSCULAR; INTRAVENOUS; SUBCUTANEOUS
Status: DISCONTINUED | OUTPATIENT
Start: 2018-06-27 | End: 2018-06-28 | Stop reason: HOSPADM

## 2018-06-27 RX ORDER — EPHEDRINE SULFATE/0.9% NACL/PF 25 MG/5 ML
SYRINGE (ML) INTRAVENOUS AS NEEDED
Status: DISCONTINUED | OUTPATIENT
Start: 2018-06-27 | End: 2018-06-27 | Stop reason: HOSPADM

## 2018-06-27 RX ORDER — OXYCODONE HYDROCHLORIDE 5 MG/1
5 TABLET ORAL ONCE
Qty: 50 TAB | Refills: 0 | Status: SHIPPED | OUTPATIENT
Start: 2018-06-27 | End: 2018-06-27

## 2018-06-27 RX ORDER — ONDANSETRON 2 MG/ML
4 INJECTION INTRAMUSCULAR; INTRAVENOUS ONCE
Status: DISCONTINUED | OUTPATIENT
Start: 2018-06-27 | End: 2018-06-27 | Stop reason: HOSPADM

## 2018-06-27 RX ORDER — MIDAZOLAM HYDROCHLORIDE 1 MG/ML
INJECTION, SOLUTION INTRAMUSCULAR; INTRAVENOUS AS NEEDED
Status: DISCONTINUED | OUTPATIENT
Start: 2018-06-27 | End: 2018-06-27 | Stop reason: HOSPADM

## 2018-06-27 RX ORDER — KETAMINE HYDROCHLORIDE 10 MG/ML
INJECTION, SOLUTION INTRAMUSCULAR; INTRAVENOUS AS NEEDED
Status: DISCONTINUED | OUTPATIENT
Start: 2018-06-27 | End: 2018-06-27 | Stop reason: HOSPADM

## 2018-06-27 RX ORDER — ACETAMINOPHEN 325 MG/1
650 TABLET ORAL
Status: DISCONTINUED | OUTPATIENT
Start: 2018-06-27 | End: 2018-06-28 | Stop reason: HOSPADM

## 2018-06-27 RX ORDER — CEFAZOLIN SODIUM/WATER 2 G/20 ML
2 SYRINGE (ML) INTRAVENOUS ONCE
Status: COMPLETED | OUTPATIENT
Start: 2018-06-27 | End: 2018-06-27

## 2018-06-27 RX ORDER — DEXTROSE 50 % IN WATER (D50W) INTRAVENOUS SYRINGE
25-50 AS NEEDED
Status: DISCONTINUED | OUTPATIENT
Start: 2018-06-27 | End: 2018-06-27 | Stop reason: HOSPADM

## 2018-06-27 RX ADMIN — PROPOFOL 50 MG: 10 INJECTION, EMULSION INTRAVENOUS at 13:29

## 2018-06-27 RX ADMIN — LIDOCAINE HYDROCHLORIDE 100 MG: 20 INJECTION, SOLUTION EPIDURAL; INFILTRATION; INTRACAUDAL; PERINEURAL at 13:28

## 2018-06-27 RX ADMIN — MIDAZOLAM HYDROCHLORIDE 1 MG: 1 INJECTION, SOLUTION INTRAMUSCULAR; INTRAVENOUS at 12:44

## 2018-06-27 RX ADMIN — TRANEXAMIC ACID 1 G: 100 INJECTION, SOLUTION INTRAVENOUS at 13:36

## 2018-06-27 RX ADMIN — PREGABALIN 50 MG: 50 CAPSULE ORAL at 12:34

## 2018-06-27 RX ADMIN — SODIUM CHLORIDE, SODIUM LACTATE, POTASSIUM CHLORIDE, AND CALCIUM CHLORIDE 125 ML/HR: 600; 310; 30; 20 INJECTION, SOLUTION INTRAVENOUS at 11:34

## 2018-06-27 RX ADMIN — DEXMEDETOMIDINE HYDROCHLORIDE 8 MCG: 4 INJECTION, SOLUTION INTRAVENOUS at 13:52

## 2018-06-27 RX ADMIN — DEXMEDETOMIDINE HYDROCHLORIDE 8 MCG: 4 INJECTION, SOLUTION INTRAVENOUS at 13:34

## 2018-06-27 RX ADMIN — SODIUM CHLORIDE, SODIUM LACTATE, POTASSIUM CHLORIDE, AND CALCIUM CHLORIDE 125 ML/HR: 600; 310; 30; 20 INJECTION, SOLUTION INTRAVENOUS at 17:50

## 2018-06-27 RX ADMIN — GLYCOPYRROLATE 0.2 MG: 0.2 INJECTION INTRAMUSCULAR; INTRAVENOUS at 13:32

## 2018-06-27 RX ADMIN — CELECOXIB 200 MG: 100 CAPSULE ORAL at 12:34

## 2018-06-27 RX ADMIN — ROPIVACAINE HYDROCHLORIDE 20 ML: 5 INJECTION, SOLUTION EPIDURAL; INFILTRATION; PERINEURAL at 12:47

## 2018-06-27 RX ADMIN — SODIUM CHLORIDE, SODIUM LACTATE, POTASSIUM CHLORIDE, AND CALCIUM CHLORIDE: 600; 310; 30; 20 INJECTION, SOLUTION INTRAVENOUS at 14:13

## 2018-06-27 RX ADMIN — Medication 2 G: at 13:40

## 2018-06-27 RX ADMIN — PROPOFOL 200 MG: 10 INJECTION, EMULSION INTRAVENOUS at 13:28

## 2018-06-27 RX ADMIN — DEXMEDETOMIDINE HYDROCHLORIDE 8 MCG: 4 INJECTION, SOLUTION INTRAVENOUS at 13:39

## 2018-06-27 RX ADMIN — ACETAMINOPHEN 1000 MG: 500 TABLET, FILM COATED ORAL at 12:34

## 2018-06-27 RX ADMIN — TRANEXAMIC ACID 1 G: 100 INJECTION, SOLUTION INTRAVENOUS at 14:42

## 2018-06-27 RX ADMIN — KETAMINE HYDROCHLORIDE 50 MG: 10 INJECTION, SOLUTION INTRAMUSCULAR; INTRAVENOUS at 13:35

## 2018-06-27 RX ADMIN — DEXAMETHASONE SODIUM PHOSPHATE 4 MG: 4 INJECTION, SOLUTION INTRA-ARTICULAR; INTRALESIONAL; INTRAMUSCULAR; INTRAVENOUS; SOFT TISSUE at 13:50

## 2018-06-27 RX ADMIN — DEXMEDETOMIDINE HYDROCHLORIDE 8 MCG: 4 INJECTION, SOLUTION INTRAVENOUS at 14:03

## 2018-06-27 RX ADMIN — SODIUM CHLORIDE, SODIUM LACTATE, POTASSIUM CHLORIDE, AND CALCIUM CHLORIDE 125 ML/HR: 600; 310; 30; 20 INJECTION, SOLUTION INTRAVENOUS at 15:50

## 2018-06-27 RX ADMIN — SODIUM CHLORIDE, SODIUM LACTATE, POTASSIUM CHLORIDE, AND CALCIUM CHLORIDE 125 ML/HR: 600; 310; 30; 20 INJECTION, SOLUTION INTRAVENOUS at 21:01

## 2018-06-27 RX ADMIN — Medication 10 MG: at 14:41

## 2018-06-27 RX ADMIN — MIDAZOLAM HYDROCHLORIDE 4 MG: 1 INJECTION, SOLUTION INTRAMUSCULAR; INTRAVENOUS at 12:43

## 2018-06-27 RX ADMIN — DEXMEDETOMIDINE HYDROCHLORIDE 8 MCG: 4 INJECTION, SOLUTION INTRAVENOUS at 13:54

## 2018-06-27 RX ADMIN — FENTANYL CITRATE 25 MCG: 50 INJECTION, SOLUTION INTRAMUSCULAR; INTRAVENOUS at 15:56

## 2018-06-27 RX ADMIN — Medication 2 G: at 21:01

## 2018-06-27 RX ADMIN — ONDANSETRON 4 MG: 2 INJECTION INTRAMUSCULAR; INTRAVENOUS at 13:50

## 2018-06-27 RX ADMIN — FENTANYL CITRATE 25 MCG: 50 INJECTION, SOLUTION INTRAMUSCULAR; INTRAVENOUS at 15:46

## 2018-06-27 NOTE — INTERVAL H&P NOTE
H&P Update:  Hardeep Berger was seen and examined. History and physical has been reviewed. The patient has been examined. There have been no significant clinical changes since the completion of the originally dated History and Physical.  Patient identified by surgeon; surgical site was confirmed by patient and surgeon.     Signed By: Soren Segovia MD     June 27, 2018 12:05 PM

## 2018-06-27 NOTE — ANESTHESIA PREPROCEDURE EVALUATION
Anesthetic History   No history of anesthetic complications            Review of Systems / Medical History  Patient summary reviewed, nursing notes reviewed and pertinent labs reviewed    Pulmonary  Within defined limits                 Neuro/Psych   Within defined limits           Cardiovascular                  Exercise tolerance: >4 METS     GI/Hepatic/Renal  Within defined limits              Endo/Other  Within defined limits           Other Findings              Physical Exam    Airway  Mallampati: III  TM Distance: 4 - 6 cm  Neck ROM: normal range of motion   Mouth opening: Normal     Cardiovascular  Regular rate and rhythm,  S1 and S2 normal,  no murmur, click, rub, or gallop             Dental    Dentition: Full upper dentures and Full lower dentures     Pulmonary  Breath sounds clear to auscultation               Abdominal  GI exam deferred       Other Findings            Anesthetic Plan    ASA: 2  Anesthesia type: general and regional - femoral single shot  Risk and benefits fully explained to the patient including bleeding, headache, nerve damage, infection, nausea, back pain, and hemodynamic changes. Patient understands and agrees to the procedure.     Post-op pain plan if not by surgeon: peripheral nerve block single    Induction: Intravenous  Anesthetic plan and risks discussed with: Patient

## 2018-06-27 NOTE — ANESTHESIA POSTPROCEDURE EVALUATION
Post-Anesthesia Evaluation & Assessment    Visit Vitals    /58    Pulse (!) 59    Temp 36.6 °C (97.9 °F)    Resp 19    Ht 5' 5\" (1.651 m)    Wt 94 kg (207 lb 5 oz)    SpO2 97%    BMI 34.5 kg/m2       No untreated/active PONV    Post-operative hydration adequate. Adequate post-operative analgesia per PACU discharge criteria    Mental status & level of consciousness: alert and oriented x 3    Respiratory status: patent unassisted airway     No apparent anesthetic complications requiring additional post-anesthetic care    Patient has met all discharge requirements.             Guy Dueñas MD

## 2018-06-27 NOTE — BRIEF OP NOTE
BRIEF OPERATIVE NOTE    Date of Procedure: 6/27/2018   Preoperative Diagnosis: UNILATERAL PRIMARY OSTEOARTHRITIS, RIGHT KNEE  Postoperative Diagnosis: UNILATERAL PRIMARY OSTEOARTHRITIS, RIGHT KNEE    Procedure(s):  RIGHT TOTAL KNEE REPLACEMENT  Surgeon(s) and Role:     * Rani Haji MD - Primary         Surgical Assistant: none    Surgical Staff:  Circ-1: Andre Quiñonez RN  Scrub Tech-1: Anisha Rudd  Scrub Tech-Relief: [facility]; [facility]  Surg Asst-1: Marcos Hartley  Event Time In   Incision Start 1344   Incision Close 1510     Anesthesia: General   Estimated Blood Loss: <100  Specimens: * No specimens in log *   Findings: severe djd   Complications: none  Implants:   Implant Name Type Inv.  Item Serial No.  Lot No. LRB No. Used Action   PAT ASYM MTL-BK 11MM SZ A38 -- TRIATHLON - [id number]  PAT ASYM MTL-BK 11MM SZ A38 -- TRIATHLON  [employer] [facility] DLEY Right 1 Implanted   INSERT TIB CR TRIATHLN X3 7 11 --  - [id number]  INSERT TIB CR TRIATHLN X3 7 11 --   [employer] [facility] [id number] Right 1 Implanted   BASEPLT TIB PC TRITNM SZ 7 -- TRIATHLON - [id number]  BASEPLT TIB PC TRITNM SZ 7 -- TRIATHLON  [employer] [facility] [id number] Right 1 Implanted   COMPNT FEM CR TRIATHLN 7 R PA --  - [id number]   COMPNT FEM CR TRIATHLN 7 R PA --    [employer] HOW [id number] Right 1 Implanted

## 2018-06-27 NOTE — OP NOTES
TOTAL KNEE ARTHROPLASTY OP NOTE      OPERATIVE REPORT    Patient: Collin Veras CSN: [id number] SSN: [id number]    YOB: 1945  Age: 67 y.o. Sex: male      Admit Date: 6/27/2018  Admit Diagnosis: UNILATERAL PRIMARY OSTEOARTHRITIS, RIGHT KNEE  Knee osteoarthritis    Date of Procedure: 6/27/2018   Preoperative Diagnosis: UNILATERAL PRIMARY OSTEOARTHRITIS, RIGHT KNEE  Postoperative Diagnosis: UNILATERAL PRIMARY OSTEOARTHRITIS, RIGHT KNEE    Procedure: Procedure(s):  RIGHT TOTAL KNEE REPLACEMENT  Surgeon: Stephanie Weir MD  Assistant(s):    Anesthesia: General   Estimated Blood Loss:<50CC  Specimens: * No specimens in log *   Complications: None  Implants:   Implant Name Type Inv. Item Serial No.  Lot No. LRB No. Used Action   PAT ASYM MTL-BK 11MM SZ A38 -- TRIATHLON - [id number]  PAT ASYM MTL-BK 11MM SZ A38 -- TRIATHLON  [employer] [facility] DLEY Right 1 Implanted   INSERT TIB CR TRIATHLN X3 7 11 --  - [id number]  INSERT TIB CR TRIATHLN X3 7 11 --   [employer] HOW [id number] Right 1 Implanted   BASEPLT TIB PC TRITNM SZ 7 -- TRIATHLON - [id number]  BASEPLT TIB PC TRITNM SZ 7 -- TRIATHLON  [employer] HOW [id number] Right 1 Implanted   COMPNT FEM CR TRIATHLN 7 R PA --  - [id number]   COMPNT FEM CR TRIATHLN 7 R PA --    [employer] HOW [id number] Right 1 Implanted         INDICATIONS: The patient is a 67 y.o., male who has who has been suffering from knee arthritis. He has failed conservative therapy including activity modification, anti-inflammatories and injections. The arthritis is significantly impacting the patient's quality of life. We did discuss the option of total knee arthroplasty with them at length. He understood the risks and benefits including the risks of infection and blood clot, and elected to proceed with knee replacement.     DESCRIPTION OF PROCEDURE: After being informed of the risks and benefits, which include, but are not limited to, bleeding, infection, neurovascular damage, wound complications, pain and stiffness in the knee, periprosthetic loosening, fracture dislocation and DVT, the patient consented for the procedure. The patient was brought to the operating suite and properly identified. He was placed supine upon the operating table and placed under a general anesthetic. A regional block was placed in preoperative holding. Once an adequate level of anesthesia was obtained, a tourniquet was placed high on the operative thigh. The leg was prepped and draped in the usual sterile fashion. The leg was exsanguinated with an Esmarch. The tourniquet was inflated to 280 mmHg. Tourniquet time was approximately 1 hour. A longitudinal incision was made centered over the patella. Dissection was carried down through the subcutaneous tissue. The underlying capsule was identified and a medial parapatellar arthrotomy was made in standard fashion. The joint was exposed and evaluated. There were severe arthritic changes noted. The patella was everted. Approximately 1 cm of the articular surface was removed in a freehand fashion. Once that was completed, the knee was flexed. The distal intramedullary guide was placed and a 5-degree, 8-mm cut was made in standard fashion. The distal femur was sized, the 4-in-1 cutting block was placed in line with the posterior condyle and epicondylar access, and the anterior, posterior, and chamfer cuts were made in standard fashion. Attention was next turned to the tibia. Using the extramedullary tibial guide parallel to the axis of the tibia, we resected 9 mm below the higher plateau. The remaining plateau was sized. All osteophytes were removed. The remaining medial and lateral meniscus was excised. The trial implants were then placed with a trial insert. The knee had full flexion, full extension and good stability throughout. The PCL was noted to be intact. The patella was sized to the appropriate button.  The keel holes were drilled, and the poly was placed. The patella tracked nicely throughout full range of motion. The trial implants were then removed. The bone ends were irrigated and dried. The final implants were then pressed into place beginning with the tibia, followed by the femur, and lastly the patella. The polyethylene spacer was locked into the tibial tray. The tourniquet was deflated. Bleeding was controlled with electrocautery. Bleeding was not felt to be excessive enough to warrant a drain. The capsule was closed with a #1 Vicryl in a figure-of-eight interrupted fashion. Subcutaneous tissue was closed with 2-0 Vicryl. The skin was closed with 3-0 Monocryl in a running subcuticular fashion. Steri-strips and a sterile compressive dressing was applied. The patient was awakened and transferred to the recovery in stable condition. All needle and sponge counts were reported as correct at the end of the case.

## 2018-06-27 NOTE — IP AVS SNAPSHOT
[address] [address] 
[phone] Patient: Liset Chester MRN: [id number] About your hospitalization You were admitted on:  June 27, 2018 You last received care in the:  THE [facility] [address] You were discharged on:  June 28, 2018 Why you were hospitalized Your primary diagnosis was:  Not on File Your diagnoses also included:  Knee Osteoarthritis Follow-up Information Follow up With Details Comments Contact Info Osmani Harmon MD On 7/12/2018 Follow up appointment @ 4:00pm [address] [place] [address] [address] [place] [address] 
[phone] Yesi Moffett MD   [facility] [address] [place] [facility] [address] 
[phone] Discharge Orders None A check duyen indicates which time of day the medication should be taken. My Medications START taking these medications Instructions Each Dose to Equal  
 Morning Noon Evening Bedtime  
 rivaroxaban 10 mg tablet Commonly known as:  Jamie Teran Your last dose was: Your next dose is: Take 1 Tab by mouth daily (with dinner). Indications: KNEE REPLACEMENT DEEP VEIN THROMBOSIS PREVENTION  
 10 mg CONTINUE taking these medications Instructions Each Dose to Equal  
 Morning Noon Evening Bedtime  
 cholecalciferol 50,000 unit capsule Commonly known as:  VITAMIN D3 Your last dose was: Your next dose is: Take  by mouth every Monday. lovastatin 20 mg tablet Commonly known as:  MEVACOR Your last dose was: Your next dose is: Take 20 mg by mouth nightly. 20 mg  
    
   
   
   
  
  
STOP taking these medications   
 aspirin delayed-release 81 mg tablet ASK your doctor about these medications  Instructions Each Dose to Equal  
 Morning Noon Evening Bedtime  
 oxyCODONE IR 5 mg immediate release tablet Commonly known as:  Onita Estimable Ask about: Should I take this medication? Your last dose was: Your next dose is: Take 1 Tab by mouth once for 1 dose. Max Daily Amount: 5 mg. Indications: Pain  
 5 mg Where to Get Your Medications Information on where to get these meds will be given to you by the nurse or doctor. ! Ask your nurse or doctor about these medications  
  oxyCODONE IR 5 mg immediate release tablet  
 rivaroxaban 10 mg tablet Discharge Instructions Total Knee Arthroplasty Discharge Instructions Juan Hubbard M.D. Please take the time to review the following instructions before you leave the hospital and use them as guidelines during your recovery from surgery. If you have any questions you may contact my office at [phone]. Wound Care / Dressing Changes: You may change your dressing as needed. Beginning the day after you are discharged from the hospital you should change your dressing daily. A big, bulky dressing isn't necessary as long as there isn't any drainage from the incisions. You can put a band-aid or mepilex dressing over the incision. It isn't necessary to apply antibiotic ointment to your incisions. There will be a clear film covering your incision, do not remove. As the edges begin to peel, you may trim with small scissors. This film will fall of in about 1 month. Thornell Farnaz / Bathing: You may only shower. You may shower if there is no drainage from your incisions. Your dressing may be removed for showering. You may get your incisions wet in the shower. Don't vigorously scrub the area where your incisions are. Apply a clean, dry dressing after drying off the area of your incisions. Don't take a tub bath, get in a swimming pool or Jacuzzi until the incisions are completely healed.  Do not soak your incisions under water. Weight Bearing Status / Braces:  
 
___X__ Weight bearing as tolerated. Use crutches, walker, or cane as needed for support. You may move your joints as much as tolerated.  
 
_____  Jetty He Touch\" weight bearing. Don't bear weight on the leg you had operated on. Use your toes only to steady yourself as you ambulate with crutches or a walker. _____ Avoid extreme hip extension with external rotation. Home Health: 
 
Home health has been arranged for skilled nursing visits and physical therapy. Your home health has been set up through____________ . If no one from the agency calls you on the day after you arrive home, please contact them at the number provided at discharge. Physical therapy for gait training and strengthening. Continue therapeutic exercises. Physical Therapy:    
 
Begin In-Home Physical Therapy 3 times a week to work on gait training, range of motion, strengthening, and weight bearing exercises as tolerable. Continue to use your walker or cane when walking. You may progress from the walker to a cane to full weight bearing as tolerable. Ice / Elevation:  
 
Continue ice and elevation as needed for pain and swelling. Diet:  
 
Resume your pre hospital diet. If you have excessive nausea or vomiting call your doctor's office. Medication:  
 
1. You will be given a prescription for pain medication when you are discharged for the hospital. Take the medication as needed according to the directions on the prescription bottle. Possible side effects of the medication include dizziness, headache, nausea, vomiting, constipation and urinary retention. If you experience any of these side effects call the office so that we can assist you in relieving them. Discontinue the use of the pain medication if you develop itching, rash, shortness of breath or difficulties swallowing.  If these symptoms become severe or aren't relieved by discontinuing the medication you should seek immediate medical attention. Refills of pain medication are authorized during office hours only. (8am - 5pm Mon. thru Fri.) 1. You should take Xarelto daily as directed for 12 days from the date of your surgery. This will help to prevent blood clots from forming in your legs. 2. You may resume the medication you were taking prior to your surgery. Pain medication may change the effects of any antidepressant medication you are taking. If you have any questions about possible interactions between your regular medications and the pain medication you should consult the physician who prescribes your regular medications. 3.   Please take over the counter stool softeners while you are taking narcotic pain                  medication. Pain medications can cause constipation. Stool softeners, warm  
      prune juice and increasing your water and fiber intake can help prevent constipation. Do not take laxatives. Follow Up Appointment:  
Please call [phone] for a follow appointment with Dr. Amarjit Romero in 10-14 days from the time of your surgery. Please let our office know you are scheduling a post-op appointment. Signs and Symptoms to be Aware of: If any of the following signs and symptoms occur, you should contact Dr. Jeronimo Gibson office. Please be advised if a problem arises which you feel requires immediate medical attention or you are unable to contact Dr. Jeronimo Gibson office you should seek immediate medical attention at the emergency department or other health care facility you have access to. Signs and symptoms to watch for include: 1. A sudden increase in swelling and or redness or warmth at the area your surgery was performed which isn't relieved by rest, ice and elevation.   
2 Oral temperature greater than 101.5 degrees for 12 hours or more which isn't relieved by an increase in fluid intake and taking two Tylenol every 4-6 hours. 3 Excessive drainage from your incisions, or drainage which hasn't stopped by 72 hours after your surgery despite applying a compressive dressing, ice and elevation. 4 Calfpain, tenderness, redness or swelling which isn't relieved with rest and elevation. 5 Fever, chills, shortness ofbreath, chest pain, nausea, vomiting or other signs and symptoms which are of concern to you. Other Instructions: "[employer] Announcement We are excited to announce that we are making your provider's discharge notes available to you in "[employer]. You will see these notes when they are completed and signed by the physician that discharged you from your recent hospital stay. If you have any questions or concerns about any information you see in "[employer] please call the Health Information Department where you were seen or reach out to your Primary Care Provider for more information about your plan of care. Introducing [facility]! Dear Nery Kwong: 
Thank you for requesting a "[employer] account. Our records indicate that you already have an active "[employer] account. You can access your account anytime at [email or web address] Did you know that you can access your hospital and ER discharge instructions at any time in "[employer]? You can also review all of your test results from your hospital stay or ER visit. Additional Information If you have questions, please visit the Frequently Asked Questions section of the "[employer] website at [email or web address] Remember, "[employer] is NOT to be used for urgent needs. For medical emergencies, dial 911. Now available from your iPhone and Android! Introducing Ranjit Magaña As a [facility] patient, I wanted to make you aware of our electronic visit tool called Ranjit Magaña. [facility] 24/7 allows you to connect within minutes with a medical provider 24 hours a day, seven days a week via a mobile device or tablet or logging into a secure website from your computer. You can access [employer] from anywhere in the [place]. A virtual visit might be right for you when you have a simple condition and feel like you just dont want to get out of bed, or cant get away from work for an appointment, when your regular [employer] provider is not available (evenings, weekends or holidays), or when youre out of town and need minor care. Electronic visits cost only $49 and if the [employer] 24/7 provider determines a prescription is needed to treat your condition, one can be electronically transmitted to a nearby pharmacy*. Please take a moment to enroll today if you have not already done so. The enrollment process is free and takes just a few minutes. To enroll, please download the [employer] 24/7 boston to your tablet or phone, or visit [email or web address] to enroll on your computer. And, as an [address] [place] [address] patient with a [employer] account, the results of your visits will be scanned into your electronic medical record and your primary care provider will be able to view the scanned results. We urge you to continue to see your regular [employer] provider for your ongoing medical care. And while your primary care provider may not be the one available when you seek a Ranjit Harrisonfin virtual visit, the peace of mind you get from getting a real diagnosis real time can be priceless. For more information on [employer], view our Frequently Asked Questions (FAQs) at [email or web address]. Sincerely, 
 
Nixon Carrera MD 
Chief Medical Officer [address] *:  certain medications cannot be prescribed via [employer] Unresulted tests-please follow up with your PCP on these results Procedure/Test Authorizing Provider  CBC W/O DIFF Stefany Hamilton MD  
 METABOLIC PANEL, BASIC Elana Ferrari MD  
 PROTHROMBIN TIME + INR Elana Ferrari MD  
 XR KNEE RT MAX 2 VWS Elana Ferrari MD  
  
Providers Seen During Your Hospitalization Provider Specialty Primary office phone Elana Ferrari MD Orthopedic Surgery [phone] Your Primary Care Physician (PCP) Primary Care Physician Office Phone Office Fax Rosemary Rodriguez [phone] You are allergic to the following No active allergies Recent Documentation Height Weight BMI Smoking Status 1.651 m 94 kg 34.5 kg/m2 Current Every Day Smoker Emergency Contacts Name Discharge Info Relation Home Work Mobile [address] CAREGIVER [3] Son [22]   [phone] Patient Belongings The following personal items are in your possession at time of discharge: 
  Dental Appliances: Lowers, Partials, Uppers, Sent home (GIven to [facility] )  Visual Aid: Glasses, With patient      Home Medications: Sent home (Given to [facility] )   Jewelry: None  Clothing: Footwear, Pants, Sent home, Shirt, Undergarments (Given to [facility] )    Other Valuables: Wallet, [employer], Eyeglasses (Given to [facility] ) Please provide this summary of care documentation to your next provider. Signatures-by signing, you are acknowledging that this After Visit Summary has been reviewed with you and you have received a copy. Patient Signature:  ____________________________________________________________ Date:  ____________________________________________________________  
  
Riaz Hernandez Provider Signature:  ____________________________________________________________ Date:  ____________________________________________________________

## 2018-06-27 NOTE — IP AVS SNAPSHOT
[address] [address] 
[phone] Patient: Rosy Patel MRN: [id number] ICC:[id number] A check duyen indicates which time of day the medication should be taken. My Medications START taking these medications Instructions Each Dose to Equal  
 Morning Noon Evening Bedtime  
 rivaroxaban 10 mg tablet Commonly known as:  Kevan Crump Your last dose was: Your next dose is: Take 1 Tab by mouth daily (with dinner). Indications: KNEE REPLACEMENT DEEP VEIN THROMBOSIS PREVENTION  
 10 mg CONTINUE taking these medications Instructions Each Dose to Equal  
 Morning Noon Evening Bedtime  
 cholecalciferol 50,000 unit capsule Commonly known as:  VITAMIN D3 Your last dose was: Your next dose is: Take  by mouth every Monday. lovastatin 20 mg tablet Commonly known as:  MEVACOR Your last dose was: Your next dose is: Take 20 mg by mouth nightly. 20 mg  
    
   
   
   
  
  
STOP taking these medications   
 aspirin delayed-release 81 mg tablet ASK your doctor about these medications Instructions Each Dose to Equal  
 Morning Noon Evening Bedtime  
 oxyCODONE IR 5 mg immediate release tablet Commonly known as:  Regan Chandler Ask about: Should I take this medication? Your last dose was: Your next dose is: Take 1 Tab by mouth once for 1 dose. Max Daily Amount: 5 mg. Indications: Pain  
 5 mg Where to Get Your Medications Information on where to get these meds will be given to you by the nurse or doctor. ! Ask your nurse or doctor about these medications  
  oxyCODONE IR 5 mg immediate release tablet  
 rivaroxaban 10 mg tablet

## 2018-06-27 NOTE — ROUTINE PROCESS
TRANSFER - IN REPORT:    Verbal report received from [facility] RN(name) on Hardeep Berger  being received from PACU(unit) for routine post - op      Report consisted of patients Situation, Background, Assessment and   Recommendations(SBAR). Information from the following report(s) SBAR, Kardex, OR Summary, Procedure Summary, Intake/Output, MAR and Recent Results was reviewed with the receiving nurse. Opportunity for questions and clarification was provided. Assessment completed upon patients arrival to unit and care assumed.

## 2018-06-27 NOTE — PERIOP NOTES
TRANSFER - OUT REPORT:    Verbal report given to [id number] JOSEPH Leonard RN(name) on Hardeep Berger  being transferred to room 213(unit) for routine post - op       Report consisted of patients Situation, Background, Assessment and   Recommendations(SBAR). Information from the following report(s) SBAR was reviewed with the receiving nurse. Lines:   Peripheral IV 06/27/18 Left Hand (Active)   Site Assessment Clean, dry, & intact 6/27/2018  4:00 PM   Phlebitis Assessment 0 6/27/2018  4:00 PM   Infiltration Assessment 0 6/27/2018  4:00 PM   Dressing Status Clean, dry, & intact 6/27/2018  4:00 PM   Dressing Type Transparent 6/27/2018  4:00 PM   Hub Color/Line Status Green; Infusing 6/27/2018  4:00 PM        Opportunity for questions and clarification was provided.       Patient transported with:   O2 @ 2 liters

## 2018-06-27 NOTE — PROGRESS NOTES
Problem: Mobility Impaired (Adult and Pediatric)  Goal: *Acute Goals and Plan of Care (Insert Text)  In 1-7 days pt will be able to perform:  ST.  Bed mobility:  Rolling L to R to L modified independent for positioning. 2.  Supine to sit to supine S with HR for meals. 3.  Sit to stand to sit S with RW in prep for ambulation. LT.  Gait:  Ambulate >150ft S with RW, WBAT, for home/community mobility. 2.  Stair Negotiation:  Ascend/descend >2 steps CGA with HR for home entry. 3.  Activity tolerance: Tolerate up in chair 1-2 hours for ADLs. 4.  Patient/Family Education:  Patient/family to be independent with HEP for follow-up care and safe discharge. physical Therapy EVALUATION    Patient: Brinton Saint (73 y.o. male)  Date: 2018  Primary Diagnosis: UNILATERAL PRIMARY OSTEOARTHRITIS, RIGHT KNEE  Knee osteoarthritis  Procedure(s) (LRB):  RIGHT TOTAL KNEE REPLACEMENT (Right) Day of Surgery   Precautions:   Fall, WBAT    ASSESSMENT :  Based on the objective data described below, the patient presents with decreased functional mobility and independence in regard to bed mobility, transfers, gt quality and tolerance, R knee AROM, R knee strength, pain, stair negotiation and safety due to recent R TKA surgery. Pt rating pain in R knee 2/10 on numerical pain scale. Pt required CGA/min A for supine>sit<>stand. Pt able to participate in gt training w/ RW, WBAT, GB and CGA in hallway w/ antalgic pattern. Pt has tendency to keep R knee NWB and hop despite vc. There is a language barrier impacting safety but family assists w/ interpretation. Pt returned to sitting EOB to eat meal w/ all needs within reach. Nurse Sharifa Allan aware and family present. Recommend [facility] upon hospital d/c. Patient will benefit from skilled intervention to address the above impairments.   Patients rehabilitation potential is considered to be Good  Factors which may influence rehabilitation potential include:   []         None noted  []         Mental ability/status  []         Medical condition  []         Home/family situation and support systems  []         Safety awareness  [x]         Pain tolerance/management  []         Other:      PLAN :  Recommendations and Planned Interventions:  [x]           Bed Mobility Training             []    Neuromuscular Re-Education  [x]           Transfer Training                   []    Orthotic/Prosthetic Training  [x]           Gait Training                          []    Modalities  [x]           Therapeutic Exercises          []    Edema Management/Control  [x]           Therapeutic Activities            [x]    Patient and Family Training/Education  []           Other (comment):    Frequency/Duration: Patient will be followed by physical therapy twice daily to address goals. Discharge Recommendations: Home Health  Further Equipment Recommendations for Discharge: N/A     SUBJECTIVE:   Patient stated Carter Turcios is an easing into this marriage.     OBJECTIVE DATA SUMMARY:     Past Medical History:   Diagnosis Date    Chronic pain     right knee     Hypercholesteremia      Past Surgical History:   Procedure Laterality Date    HX HEENT  2006    right ear drum    HX ORTHOPAEDIC  1950    right knee above knee insertion site/indentation; childhood     Barriers to Learning/Limitations: None  Compensate with: visual, verbal, tactile, kinesthetic cues/model  Prior Level of Function/Home Situation:   Home Situation  Home Environment: Private residence  # Steps to Enter: 2  One/Two Story Residence: Two story  # of Interior Steps: 15  Interior Rails: Both  Lift Chair Available: No  Living Alone: No  Support Systems: Family member(s), Spouse/Significant Other/Partner  Patient Expects to be Discharged to[de-identified] Private residence  Current DME Used/Available at Home: Raised toilet seat  Critical Behavior:  Neurologic State: Alert; Appropriate for age  Orientation Level: Oriented X4  Cognition: Appropriate decision making; Appropriate for age attention/concentration; Appropriate safety awareness; Follows commands  Psychosocial  Patient Behaviors: Calm; Cooperative; Talkative  Family  Behaviors: Supportive;Calm  Skin Condition/Temp: Dry;Warm  Family  Behaviors: Supportive;Calm  Skin Integrity: Incision (comment) (R knee)  Skin Integumentary  Skin Color: Appropriate for ethnicity  Skin Condition/Temp: Dry;Warm  Skin Integrity: Incision (comment) (R knee)  Strength:    Strength: Generally decreased, functional  Tone & Sensation:   Tone: Normal  Sensation: Intact  Range Of Motion:  AROM: Generally decreased, functional  Functional Mobility:  Bed Mobility:  Supine to Sit: Contact guard assistance (vc)  Sit to Supine: Contact guard assistance (vc)  Scooting: Contact guard assistance (vc)  Transfers:  Sit to Stand: Minimum assistance;Contact guard assistance (vc)  Stand to Sit: Contact guard assistance (vc)  Balance:   Sitting: Intact  Standing: Intact; With support  Ambulation/Gait Training:  Distance (ft): 80 Feet (ft)  Assistive Device: Walker, rolling;Gait belt  Ambulation - Level of Assistance: Contact guard assistance;Minimal assistance (vc)  Gait Abnormalities: Antalgic;Decreased step clearance; Step to gait (tendency to hop)  Right Side Weight Bearing: As tolerated  Base of Support: Shift to left  Stance: Right decreased  Speed/Luiza: Slow  Step Length: Left shortened;Right shortened  Swing Pattern: Left asymmetrical;Right asymmetrical  Interventions: Safety awareness training; Tactile cues; Verbal cues  Therapeutic Exercises:   HEP written copy issued to pt per MD protocol. Pain:  Pain Scale 1: Numeric (0 - 10)  Pain Intensity 1: 2  Pain Location 1: Knee  Pain Orientation 1: Right  Pain Description 1: Aching  Pain Intervention(s) 1: Ice  Activity Tolerance:   Fair  Please refer to the flowsheet for vital signs taken during this treatment.   After treatment:   [x]         Patient left in no apparent distress sitting up EOB  [] Patient left in no apparent distress in bed  [x]         Call bell left within reach  [x]         Nursing notified  [x]         Family present  []         Bed alarm activated    COMMUNICATION/EDUCATION:   [x]         Fall prevention education was provided and the patient/caregiver indicated understanding. [x]         Patient/family have participated as able in goal setting and plan of care. [x]         Patient/family agree to work toward stated goals and plan of care. []         Patient understands intent and goals of therapy, but is neutral about his/her participation. []         Patient is unable to participate in goal setting and plan of care. Thank you for this referral.  Roxie Hamilton, PT   Time Calculation: 37 mins      Eval Complexity: History: HIGH Complexity :3+ comorbidities / personal factors will impact the outcome/ POC Exam:MEDIUM Complexity : 3 Standardized tests and measures addressing body structure, function, activity limitation and / or participation in recreation  Presentation: MEDIUM Complexity : Evolving with changing characteristics  Clinical Decision Making:Low Complexity amb >30' Overall Complexity:LOW      Mobility  Current  CJ= 20-39%   Goal  CI= 1-19%. The severity rating is based on the Level of Assistance required for Functional Mobility and ADLs.

## 2018-06-27 NOTE — ANESTHESIA PROCEDURE NOTES
Peripheral Block    Start time: 6/27/2018 12:43 PM  End time: 6/27/2018 12:47 PM  Performed by: Anahy Bustamante  Authorized by: Anahy Bustamante       Pre-procedure: Indications: at surgeon's request, post-op pain management, procedure for pain and primary anesthetic    Preanesthetic Checklist: patient identified, risks and benefits discussed, site marked, timeout performed, anesthesia consent given and patient being monitored      Block Type:   Block Type:   Adductor canal  Laterality:  Left  Monitoring:  Standard ASA monitoring, responsive to questions, oxygen, continuous pulse ox, frequent vital sign checks and heart rate  Injection Technique:  Single shot  Procedures: ultrasound guided    Patient Position: supine  Prep: chlorhexidine    Location:  Mid thigh  Needle Type:  Stimuplex  Needle Gauge:  20 G  Needle Localization:  Anatomical landmarks and ultrasound guidance  Medication Injected:  0.5%  ropivacaine  Volume (mL):  20    Assessment:  Number of attempts:  1  Injection Assessment:  Incremental injection every 5 mL, negative aspiration for CSF, no paresthesia, ultrasound image on chart, no intravascular symptoms, negative aspiration for blood and local visualized surrounding nerve on ultrasound  Patient tolerance:  Patient tolerated the procedure well with no immediate complications

## 2018-06-27 NOTE — DISCHARGE SUMMARY
Patient: Karen Blank               Sex: male         MRN: [id number]       YOB: 1945      Age:  67 y.o.        LOS:  LOS: 0 days                DOA: 6/27/2018    Discharge Date: 6/27/2018    Admission Diagnoses: UNILATERAL PRIMARY OSTEOARTHRITIS, RIGHT KNEE  Knee osteoarthritis    Discharge Diagnoses:    Problem List as of 6/27/2018  Date Reviewed: 6/27/2018          Codes Class Noted - Resolved    Knee osteoarthritis ICD-10-CM: M17.10  ICD-9-CM: 715.36  6/27/2018 - Present              This is a 67 y.o. male with a  history of ongoing knee pain secondary to degenerative joint disease. The patient has failed to respond to conservative care. The option of a total knee arthroplasty was discussed with the patient. Risks and benefits of the procedure were explained to the patient as well as other treatment options and possible surgical outcomes. The patient acknowledged and consent was obtained. The patient was therefore scheduled to undergo a right total knee arthroplasty with Dr. Anayeli Martel. The patient was taken to the operating room for the above-stated procedure. IV antibiotics were given prior to the incision. SCDs were used for DVT prophylaxis. The patient had an estimated intraoperative blood loss of less than 100  mL. The patient tolerated the procedure well without any complications, and was taken to the recovery room in stable condition. The patient was then transferred to the postoperative orthopedic floor for convalescence, PT, pain management, as well as discharge planning. Physical therapy and occupational therapy initiated their evaluation and treatment and continued to follow the patient until the patient was discharged. Yariel Docker was used for dvt prophylaxis. At the time of discharge, the patient was able to ambulate safely, go up and down stairs and had an understanding of the explicit discharge precautions and instructions following surgery.   Home Health will come out to assist the pateint with this. The patient was discharged to follow up with Dr. Mary Mendenhall in approximately 10 to 14 days. Discharge Condition: Good  DISPOSITION: stable. On the day of discharge the patient was afebrile. Vital signs were stable. The patient was in no acute distress. his Right knee incision was clean, dry, and intact. Extremity was warm and well-perfused, distally neurovascularly intact. DISCHARGE INSTRUCTIONS:  The patient will be discharged home on xarelta 10 mg every evening. Continue physical therapy for range of motion, gait training and strengthening. Continue therapeutic exercises. Follow up in 10 to 14 days with Dr. Mary Mendenhall. DISCHARGE MEDICATIONS:   Current Discharge Medication List      CONTINUE these medications which have NOT CHANGED    Details   cholecalciferol (VITAMIN D3) 50,000 unit capsule Take  by mouth every Monday. lovastatin (MEVACOR) 20 mg tablet Take 20 mg by mouth nightly. aspirin delayed-release 81 mg tablet Take 81 mg by mouth daily.

## 2018-06-28 VITALS
BODY MASS INDEX: 34.54 KG/M2 | SYSTOLIC BLOOD PRESSURE: 129 MMHG | DIASTOLIC BLOOD PRESSURE: 69 MMHG | TEMPERATURE: 97.3 F | HEIGHT: 65 IN | OXYGEN SATURATION: 100 % | WEIGHT: 207.31 LBS | HEART RATE: 54 BPM | RESPIRATION RATE: 16 BRPM

## 2018-06-28 LAB
ANION GAP SERPL CALC-SCNC: 10 MMOL/L (ref 3–18)
BUN SERPL-MCNC: 23 MG/DL (ref 7–18)
BUN/CREAT SERPL: 23 (ref 12–20)
CALCIUM SERPL-MCNC: 8.4 MG/DL (ref 8.5–10.1)
CHLORIDE SERPL-SCNC: 106 MMOL/L (ref 100–108)
CO2 SERPL-SCNC: 25 MMOL/L (ref 21–32)
CREAT SERPL-MCNC: 0.98 MG/DL (ref 0.6–1.3)
ERYTHROCYTE [DISTWIDTH] IN BLOOD BY AUTOMATED COUNT: 13 % (ref 11.6–14.5)
GLUCOSE SERPL-MCNC: 119 MG/DL (ref 74–99)
HCT VFR BLD AUTO: 36.8 % (ref 36–48)
HGB BLD-MCNC: 12.1 G/DL (ref 13–16)
INR PPP: 1.1 (ref 0.8–1.2)
MCH RBC QN AUTO: 29.9 PG (ref 24–34)
MCHC RBC AUTO-ENTMCNC: 32.9 G/DL (ref 31–37)
MCV RBC AUTO: 90.9 FL (ref 74–97)
PLATELET # BLD AUTO: 159 K/UL (ref 135–420)
PMV BLD AUTO: 11.4 FL (ref 9.2–11.8)
POTASSIUM SERPL-SCNC: 4.2 MMOL/L (ref 3.5–5.5)
PROTHROMBIN TIME: 13.9 SEC (ref 11.5–15.2)
RBC # BLD AUTO: 4.05 M/UL (ref 4.7–5.5)
SODIUM SERPL-SCNC: 141 MMOL/L (ref 136–145)
WBC # BLD AUTO: 12.1 K/UL (ref 4.6–13.2)

## 2018-06-28 PROCEDURE — 97165 OT EVAL LOW COMPLEX 30 MIN: CPT

## 2018-06-28 PROCEDURE — 97530 THERAPEUTIC ACTIVITIES: CPT

## 2018-06-28 PROCEDURE — 97116 GAIT TRAINING THERAPY: CPT

## 2018-06-28 PROCEDURE — 74011250637 HC RX REV CODE- 250/637: Performed by: ORTHOPAEDIC SURGERY

## 2018-06-28 PROCEDURE — 77030037875 HC DRSG MEPILEX <16IN BORD MOLN -A

## 2018-06-28 PROCEDURE — 80048 BASIC METABOLIC PNL TOTAL CA: CPT | Performed by: ORTHOPAEDIC SURGERY

## 2018-06-28 PROCEDURE — 74011250636 HC RX REV CODE- 250/636: Performed by: ORTHOPAEDIC SURGERY

## 2018-06-28 PROCEDURE — 85027 COMPLETE CBC AUTOMATED: CPT | Performed by: ORTHOPAEDIC SURGERY

## 2018-06-28 PROCEDURE — 85610 PROTHROMBIN TIME: CPT | Performed by: ORTHOPAEDIC SURGERY

## 2018-06-28 PROCEDURE — 36415 COLL VENOUS BLD VENIPUNCTURE: CPT | Performed by: ORTHOPAEDIC SURGERY

## 2018-06-28 RX ADMIN — RIVAROXABAN 10 MG: 10 TABLET, FILM COATED ORAL at 06:50

## 2018-06-28 RX ADMIN — Medication 2 G: at 06:50

## 2018-06-28 NOTE — PROGRESS NOTES
Transition of Care (EJ) Plan:     Chart reviewed, met with pt and wife in room. Pt planning discharge home, has family and friends to assist. Discussed discharge planning, [facility] offered, pt declines home nurse and PT. Pt states he has PT friend who can assist with exercises and he will see him at [facility]" if needed; pt also states he does not need nurse either after CM explained purpose of home nurse to assess incision, he will contact MD if needed and discharge nurse will review incision care with pt. Pt has RW for home. EJ Transportation:   How is patient being transported at discharge? Family/Friend      When? Once cleared by Therapy between 12-2pm     Is transport scheduled? N/A      Follow-up appointment and transportation:   PCP/Specialist?  See AVS for Appointment         Who is transporting to the follow-up appointment? Family/Friend      Is transport for follow up appointment scheduled? N/A    Communication plan (with patient/family): Who is being called? Patient or Next of Kin? Responsible party? Patient      What number(s) is to be used? See Facesheet      What service provider is calling for [facility] services? When are they calling? 24-48 hours following discharge    Readmission Risk? (Green/Low; Yellow/Moderate; Red/High):  Green    Care Management Interventions  PCP Verified by CM:  Yes  Transition of Care Consult (CM Consult): [address]: No  Reason Outside [place]: Patient declined ordered home care/hospice services  Discharge Durable Medical Equipment: No  Physical Therapy Consult: Yes  Occupational Therapy Consult: Yes  Current Support Network: Lives with Spouse, Own Home  Confirm Follow Up Transport: Family  Plan discussed with Pt/Family/Caregiver: Yes  Freedom of Choice Offered: Yes  Discharge Location  Discharge Placement: Home with family assistance

## 2018-06-28 NOTE — PROGRESS NOTES
Progress Note        Patient: Faiza Damon MRN: [id number]  SSN: [id number]    YOB: 1945  Age: 67 y.o. Sex: male      1 Day Post-Op status post Procedure(s) (LRB):  RIGHT TOTAL KNEE REPLACEMENT (Right)    Admit Date: 2018  Admit Diagnosis: UNILATERAL PRIMARY OSTEOARTHRITIS, RIGHT KNEE  Knee osteoarthritis    Subjective:      Doing well. No complaints. No SOB. No Chest Pain. No Nausea or Vomiting. No problems eating or voiding. Objective:        Temp (24hrs), Av.9 °F (36.6 °C), Min:97.4 °F (36.3 °C), Max:98.3 °F (36.8 °C)    Body mass index is 34.5 kg/(m^2). Patient Vitals for the past 12 hrs:   BP Temp Pulse Resp SpO2   18 0348 121/68 97.9 °F (36.6 °C) (!) 52 16 97 %   18 0014 137/68 98 °F (36.7 °C) (!) 58 18 99 %   18 2034 116/56 97.8 °F (36.6 °C) (!) 57 18 98 %   18 1938 110/73 98 °F (36.7 °C) (!) 57 14 97 %     Recent Labs      18   0436   HGB  12.1*   HCT  36.8   INR  1.1   NA  141   K  4.2   CL  106   CO2  25   BUN  23*   CREA  0.98   GLU  119*       Physical Exam:  Vital Signs are Stable. No Acute Distress. Alert and Oriented. Negative Homans sign. Toes AROM Full. Neurovascular exam is normal.    Dressing is Clean, Dry, and Intact.     Assessment/Plan:     1. stable    Continue PT/OT  Discharge Plan: Home    Signed By: Trevor Saldana MD     2018

## 2018-06-28 NOTE — PROGRESS NOTES
Problem: Falls - Risk of  Goal: *Absence of Falls  Document Yunior Fall Risk and appropriate interventions in the flowsheet.    Outcome: Progressing Towards Goal  Fall Risk Interventions:  Mobility Interventions: Patient to call before getting OOB, PT Consult for mobility concerns, OT consult for ADLs, Utilize walker, cane, or other assitive device    Mentation Interventions: Update white board    Medication Interventions: Patient to call before getting OOB, Teach patient to arise slowly    Elimination Interventions: Call light in reach, Patient to call for help with toileting needs, Toileting schedule/hourly rounds, Urinal in reach

## 2018-06-28 NOTE — DISCHARGE INSTRUCTIONS
Total Knee Arthroplasty Discharge Instructions   Angela Hickey M.D. Please take the time to review the following instructions before you leave the hospital and use them as guidelines during your recovery from surgery. If you have any questions you may contact my office at [phone]. Wound Care / Dressing Changes: You may change your dressing as needed. Beginning the day after you are discharged from the hospital you should change your dressing daily. A big, bulky dressing isn't necessary as long as there isn't any drainage from the incisions. You can put a band-aid or mepilex dressing over the incision. It isn't necessary to apply antibiotic ointment to your incisions. There will be a clear film covering your incision, do not remove. As the edges begin to peel, you may trim with small scissors. This film will fall of in about 1 month. Rosalba Signs / Bathing: You may only shower. You may shower if there is no drainage from your incisions. Your dressing may be removed for showering. You may get your incisions wet in the shower. Don't vigorously scrub the area where your incisions are. Apply a clean, dry dressing after drying off the area of your incisions. Don't take a tub bath, get in a swimming pool or Jacuzzi until the incisions are completely healed. Do not soak your incisions under water. Weight Bearing Status / Braces:     ___X__ Weight bearing as tolerated. Use crutches, walker, or cane as needed for support. You may move your joints as much as tolerated.     _____  Marrian Shallow Touch\" weight bearing. Don't bear weight on the leg you had operated on. Use your toes only to steady yourself as you ambulate with crutches or a walker. _____ Avoid extreme hip extension with external rotation. Home Health:    Home health has been arranged for skilled nursing visits and physical therapy. Your home health has been set up through____________ .  If no one from the agency calls you on the day after you arrive home, please contact them at the number provided at discharge. Physical therapy for gait training and strengthening. Continue therapeutic exercises. Physical Therapy:       Begin In-Home Physical Therapy 3 times a week to work on gait training, range of motion, strengthening, and weight bearing exercises as tolerable. Continue to use your walker or cane when walking. You may progress from the walker to a cane to full weight bearing as tolerable. Ice / Elevation:     Continue ice and elevation as needed for pain and swelling. Diet:     Resume your pre hospital diet. If you have excessive nausea or vomiting call your doctor's office. Medication:     1. You will be given a prescription for pain medication when you are discharged for the hospital. Take the medication as needed according to the directions on the prescription bottle. Possible side effects of the medication include dizziness, headache, nausea, vomiting, constipation and urinary retention. If you experience any of these side effects call the office so that we can assist you in relieving them. Discontinue the use of the pain medication if you develop itching, rash, shortness of breath or difficulties swallowing. If these symptoms become severe or aren't relieved by discontinuing the medication you should seek immediate medical attention. Refills of pain medication are authorized during office hours only. (8am - 5pm Mon. thru Fri.)     1. You should take Xarelto daily as directed for 12 days from the date of your surgery. This will help to prevent blood clots from forming in your legs. 2. You may resume the medication you were taking prior to your surgery. Pain medication may change the effects of any antidepressant medication you are taking.  If you have any questions about possible interactions between your regular medications and the pain medication you should consult the physician who prescribes your regular medications. 3.   Please take over the counter stool softeners while you are taking narcotic pain                  medication. Pain medications can cause constipation. Stool softeners, warm         prune juice and increasing your water and fiber intake can help prevent constipation. Do not take laxatives. Follow Up Appointment:   Please call [phone] for a follow appointment with Dr. Diya Womack in 10-14 days from the time of your surgery. Please let our office know you are scheduling a post-op appointment. Signs and Symptoms to be Aware of: If any of the following signs and symptoms occur, you should contact Dr. Sheldon Wilkerson office. Please be advised if a problem arises which you feel requires immediate medical attention or you are unable to contact Dr. Sheldon Wilkerson office you should seek immediate medical attention at the emergency department or other health care facility you have access to. Signs and symptoms to watch for include:     1. A sudden increase in swelling and or redness or warmth at the area your surgery was performed which isn't relieved by rest, ice and elevation. 2 Oral temperature greater than 101.5 degrees for 12 hours or more which isn't relieved by an increase in fluid intake and taking two Tylenol every 4-6 hours. 3 Excessive drainage from your incisions, or drainage which hasn't stopped by 72 hours after your surgery despite applying a compressive dressing, ice and elevation. 4 Calfpain, tenderness, redness or swelling which isn't relieved with rest and elevation. 5 Fever, chills, shortness ofbreath, chest pain, nausea, vomiting or other signs and symptoms which are of concern to you.      Other Instructions:

## 2018-06-28 NOTE — ROUTINE PROCESS
Bedside and Verbal shift change report given to Galina Jeffrey RN by Miladis Mendoza RN. Report included the following information SBAR, Kardex, OR Summary, Intake/Output and MAR.

## 2018-06-28 NOTE — PROGRESS NOTES
Problem: Falls - Risk of  Goal: *Absence of Falls  Document Yunior Fall Risk and appropriate interventions in the flowsheet.    Outcome: Progressing Towards Goal  Fall Risk Interventions:  Mobility Interventions: Patient to call before getting OOB    Mentation Interventions: Update white board    Medication Interventions: Patient to call before getting OOB    Elimination Interventions: Call light in reach

## 2018-06-28 NOTE — PROGRESS NOTES
[id number] - Patient arrives to unit at this time. Admission completed at this time. Patient is A/O x 3. IV to left hand intact and patent. Blake to left leg and plexis applied bilateral.  Elastic dressing to right knee CDI. Denies numbness/tingling. Pedal pulses palpable. Pain 0/10. Patient was oriented to the room to include use of call bell, meal ordering, and use of incentive spirometer patient able to get up to 2250 on IS. Patient was given explanation of \" up for dinner\" program and has verbalized understanding. Bed in lowest position. Phone and call bell left within reach. Patient educated on need to call for assistance before getting OOB. Plan of care for the day addressed with patient. Educated on pain medication availability and possible side effects. Summary- Pt still needs to void, ambulating with use of walker verbalizes good pain control, vitals stable and call light left in reach.

## 2018-06-28 NOTE — PROGRESS NOTES
Pt is awake, alert, oriented x 4. Sitting on chair. Ace wrap dressing to right leg dry and intact. TEDS on left leg intact. Plan is discharge today after PT clearance. 9:52 AM  Pain level 1/10 and comfortable. Sitting on chair. Lungs are clear. Abdomen soft with hypoactive BS.    1100   Pt ambulated with Pt in hallway and stairs. Pt was cleared by Pt for discharge. 46   Pt was seen by . Pt refused HH/PT. Ace wrap removed. Pt's family Rogers Stephens was taught how to change dressing. Incision intact with glue. mepilex dressing was applied. TEDS applied. 11:59 AM INT removed. Discharge instructions including side effects of meds given. Dual AVS reviewed with Tiffany Murrieta RN. All medications reviewed individually with patient. Opportunities for questions and concerns provided. Patient's arm band appropriately discarded. [phone]  Pt discharged per wheelchair accompanied by family.

## 2018-06-28 NOTE — PROGRESS NOTES
Problem: Mobility Impaired (Adult and Pediatric)  Goal: *Acute Goals and Plan of Care (Insert Text)  In 1-7 days pt will be able to perform:  ST.  Bed mobility:  Rolling L to R to L modified independent for positioning. 2.  Supine to sit to supine S with HR for meals. 3.  Sit to stand to sit S with RW in prep for ambulation. LT.  Gait:  Ambulate >150ft S with RW, WBAT, for home/community mobility. 2.  Stair Negotiation:  Ascend/descend >2 steps CGA with HR for home entry. 3.  Activity tolerance: Tolerate up in chair 1-2 hours for ADLs. 4.  Patient/Family Education:  Patient/family to be independent with HEP for follow-up care and safe discharge. Outcome: Resolved/Met Date Met: 18  physical Therapy TREATMENT/DISCHARGE    Patient: Stefanie Oconnor (73 y.o. male)  Date: 2018  Diagnosis: UNILATERAL PRIMARY OSTEOARTHRITIS, RIGHT KNEE  Knee osteoarthritis <principal problem not specified>  Procedure(s) (LRB):  RIGHT TOTAL KNEE REPLACEMENT (Right) 1 Day Post-Op  Precautions: Fall, WBAT  Chart, physical therapy assessment, plan of care and goals were reviewed. ASSESSMENT:  Pt sitting up in chair upon arrival.  Pt rating pain in R knee 1/10 on numerical pain scale. Pt able to participate in transfer training, gt training, HEP and stair training meeting goals for this level of PT. Pt is ready to transition to HHPT. Pt left sitting up in chair w/ all needs within reach. Family member present. Nurse Natalia Lance aware. Progression toward goals:  [x]      Goals met  []      Improving appropriately and progressing toward goals  []      Improving slowly and progressing toward goals  []      Not making progress toward goals and plan of care will be adjusted     PLAN:  Patient will be discharged from physical therapy at this time.   Rationale for discharge:  [x] Goals Achieved  [] [address]  [] Patient not participating in therapy  [] Other:  Discharge Recommendations:  Home Health  Further Equipment Recommendations for Discharge:  N/A     SUBJECTIVE:   Patient stated I am good!     OBJECTIVE DATA SUMMARY:   Critical Behavior:  Neurologic State: Alert, Appropriate for age  Orientation Level: Oriented X4  Cognition: Appropriate decision making, Appropriate for age attention/concentration, Appropriate safety awareness, Follows commands  Safety/Judgement: Awareness of environment, Good awareness of safety precautions  Functional Mobility Training:  Bed Mobility:  Scooting: Modified independent  Transfers:  Sit to Stand: Modified independent  Stand to Sit: Modified independent  Bed to Chair: Supervision  Balance:  Sitting: Intact  Standing: Intact; With support  Ambulation/Gait Training:  Distance (ft): 180 Feet (ft)  Assistive Device: Walker, rolling  Ambulation - Level of Assistance: Supervision;Modified independent  Gait Abnormalities: Antalgic  Right Side Weight Bearing: As tolerated  Base of Support: Shift to left  Stance: Right decreased  Speed/Luiza: Pace decreased (<100 feet/min)  Step Length: Left shortened;Right shortened  Swing Pattern: Left asymmetrical;Right asymmetrical  Interventions: Safety awareness training; Tactile cues; Verbal cues  Stairs:  Number of Stairs Trained: 10  Stairs - Level of Assistance: Stand-by assistance;Supervision   Rail Use: Both  Neuro Re-Education:  Therapeutic Exercises:   Reviewed HEP w/ pt including ankle pumps, ankle circles, LAQ, quad sets, heel slides for R.  Pain:  Pain Scale 1: Numeric (0 - 10)  Pain Intensity 1: 1  Pain Location 1: Knee  Pain Orientation 1: Right  Pain Description 1: Aching  Pain Intervention(s) 1: Ice  Activity Tolerance:   Good   Please refer to the flowsheet for vital signs taken during this treatment.   After treatment:   [x] Patient left in no apparent distress sitting up in chair  [] Patient left in no apparent distress in bed  [x] Call bell left within reach  [x] Nursing notified  [x] Caregiver present  [] Bed alarm activated  Mariam Avila, PT   Time Calculation: 26 mins

## 2018-06-28 NOTE — PROGRESS NOTES
2000 - Assumed care of patient at this time. Patient is alert and oriented x 4. Patient denies chest pain, shortness of breath, or numbness or tingling in the upper or lower extremities. Elastic bandage on the right knee is clean, dry and intact. Venous foot pumps in place on the patient bilaterally and MONIKA hose in place on the left lower extremity only. 18g IV in the left hand is patent and infusing LR @ 125mL/hr. Patient currently experiencing 0/10 pain. Bed is in lowest position with the wheels locked. Siderails x 3 are up. Call bell within reach. Patient is currently resting in bed in no apparent distress. Family members present at the bedside. Will continue to monitor. 2100 - Head to toe assessment performed at this time. Patient has no complaints of chest pain or shortness of breath. Denies any numbness or tingling in extremities. Lungs are clear to auscultation. Bowel sounds are present in all 4 quadrants. 18g IV in the right hand is patent and infusing with no signs of phlebitis or infiltration. Patient educated how to actively manage their pain. Patient encouraged to use the incentive spirometer. Patient educated on the side effects of medications. Explained the importance of ambulation. Instructed the patient not to attempt to get out of bed and/or ambulate without a staff member present. Patient verbalized understanding. Patient left in bed with call light within reach, bed in lowest position with wheels locked, and siderails x 3 up. Family member at the bedside. Will continue to monitor. 2230 - Patient lying in bed, eating and watching videos on his cell phone. Patient is still not having any pain. Family member at the bedside. Call bell within reach. Will continue to monitor. 2350 - Patient lying in bed and watching videos on his cell phone. Patient is still not having any pain. Family member at the bedside. Call bell within reach.   Will continue to monitor. 2967 - Patient lying in bed and watching videos on his cell phone. Patient is still not having any pain. Family member at the bedside. Call bell within reach. Will continue to monitor. 0630 - Patient out of bed and walking in the hallway. Will continue to monitor. 3438 - Patient now up to chair for breakfast.  IV fluids discontinued at this time. Call bell in reach. Will continue to monitor.

## 2018-06-28 NOTE — PROGRESS NOTES
Problem: Self Care Deficits Care Plan (Adult)  Goal: *Acute Goals and Plan of Care (Insert Text)  Short Term Goals 6/28/18:  Alicia Obrien OTR/L  In one session:  1. Pt will perform all basic self care and functional ADL transfers with modified independence to supervision using DME/AD as needed in order to return home safely with family. Goal met after OT session:  6/28/18. Pt reports having supportive family who will be able to assist as needed at home. Outcome: Resolved/Met Date Met: 06/28/18  Occupational Therapy EVALUATION/discharge    Patient: Leonel Garcia (73 y.o. male)  Date: 6/28/2018  Primary Diagnosis: UNILATERAL PRIMARY OSTEOARTHRITIS, RIGHT KNEE  Knee osteoarthritis  Procedure(s) (LRB):  RIGHT TOTAL KNEE REPLACEMENT (Right) 1 Day Post-Op   Precautions:   Fall, WBAT    ASSESSMENT AND RECOMMENDATIONS:  Based on the objective data described below, the patient presents with ability to perform ADL tasks at near baseline level. After OT session today patient was able to perform basic self care and functional ADL transfers with modified independence to supervision. Pt demonstrates good activity tolerance, no loss of balance, and minimal pain. Pt with no further OT/ADL concerns at this time. Pt reports having supportive family who can assist as needed at home. Skilled occupational therapy is not indicated at this time. Education: fall prevention; balance recovery; safety    Discharge Recommendations: Home Health  Further Equipment Recommendations for Discharge: rolling walker      SUBJECTIVE:   Patient stated I want to go home ASAP.     OBJECTIVE DATA SUMMARY:     Past Medical History:   Diagnosis Date    Chronic pain     right knee     Hypercholesteremia      Past Surgical History:   Procedure Laterality Date    HX HEENT  2006    right ear drum    HX ORTHOPAEDIC  1950    right knee above knee insertion site/indentation; childhood     Barriers to Learning/Limitations: None  Compensate with: visual, verbal, tactile, kinesthetic cues/model    G-Codes (GP)  Self Care   Current  CI= 1-19%   Goal  CI= 1-19%   D/C  CI= 1-19%  The severity rating is based on the professional judgement & direct observation of Level of Assistance required for Functional Mobility and ADLs. Eval Complexity: History: LOW Complexity : Brief history review ; Examination: LOW Complexity : 1-3 performance deficits relating to physical, cognitive , or psychosocial skils that result in activity limitations and / or participation restrictions ; Decision Making:LOW Complexity : No comorbidities that affect functional and no verbal or physical assistance needed to complete eval tasks     Prior Level of Function/Home Situation: Pt independent with all ADLs and IADLs but describes increased physical effort for distal LE dressing due to pain. Home Situation  Home Environment: Private residence  # Steps to Enter: 2  One/Two Story Residence: Two story  # of Interior Steps: 15  Interior Rails: Both  Lift Chair Available: No  Living Alone: No  Support Systems: Family member(s), Spouse/Significant Other/Partner  Patient Expects to be Discharged to[employer] residence  Current DME Used/Available at Home: Raised toilet seat, Shower chair, Grab bars, Dodie Foster, straight, Walker, rolling  Tub or Shower Type: Tub/Shower combination  Cognitive/Behavioral Status:  Neurologic State: Alert; Appropriate for age  Orientation Level: Oriented X4  Cognition: Appropriate decision making  Safety/Judgement: Awareness of environment;Good awareness of safety precautions  Skin: site of incision R knee  Edema: RLE  Coordination:  Coordination: Within functional limits  Fine Motor Skills-Upper: Left Intact; Right Intact    Gross Motor Skills-Upper: Left Intact; Right Intact  Balance:  Sitting: Intact  Standing: Intact; With support  Tone & Sensation:  Tone: Normal  Sensation: Intact  Functional Mobility and Transfers for ADLs:  Transfers:  Sit to Stand: Supervision  Bed to Chair: Supervision   Toilet Transfer : Supervision     Bathroom Mobility: Supervision/set up  ADL Assessment:  Feeding: Independent  Oral Facial Hygiene/Grooming: Supervision  Upper Body Dressing: Independent  Lower Body Dressing: Supervision  Toileting: Supervision   ADL Intervention:  Grooming  Grooming Assistance: Modified independent  Washing Face: Modified independent  Washing Hands: Modified independent  Brushing Teeth: Modified independent    Upper Body Dressing Assistance  Pullover Shirt: Independent    Lower Body Dressing Assistance  Underpants: Modified independent  Pants With Button/Zipper: Modified independent  Socks: Modified independent    Toileting  Toileting Assistance: Modified independent  Clothing Management: Modified independent  Adaptive Equipment: Walker    Cognitive Retraining  Safety/Judgement: Awareness of environment;Good awareness of safety precautions    Pain:  Pre-treatment: 2/10  Post-treatment: 2/10  Activity Tolerance:   WFLs; standing tolerance 7-10 minutes; minimal pain; no LOB; able to perform dressing tasks without rest breaks  Please refer to the flowsheet for vital signs taken during this treatment. After treatment:   [x]  Patient left in no apparent distress sitting up in chair  []  Patient left in no apparent distress in bed  [x]  Call bell left within reach  [x]  Nursing notified  []  Caregiver present  []  Bed alarm activated    COMMUNICATION/EDUCATION:   Communication/Collaboration:  [x]      Home safety education was provided and the patient/caregiver indicated understanding. [x]      Patient/family have participated as able and agree with findings and recommendations. []      Patient is unable to participate in plan of care at this time.     Thank you for this referral.  Zachery Robison OT  Time Calculation: 15 mins

## 2018-06-28 NOTE — ROUTINE PROCESS
Bedside and Verbal shift change report given to Fern Guallpa RN (oncoming nurse) by Ashley Jimenez RN (offgoing nurse). Report included the following information SBAR, Procedure Summary, Intake/Output and MAR.

## (undated) DEVICE — STERILE POLYISOPRENE POWDER-FREE SURGICAL GLOVES: Brand: PROTEXIS

## (undated) DEVICE — STERILE POLYISOPRENE POWDER-FREE SURGICAL GLOVES WITH EMOLLIENT COATING: Brand: PROTEXIS

## (undated) DEVICE — HANDPIECE SET WITH FAN SPRAY TIP: Brand: INTERPULSE

## (undated) DEVICE — PIN FIX L3.5IN DIA1/8IN S STL FLUT HDLSS SQ END

## (undated) DEVICE — PACK PROCEDURE SURG TOT KNEE CUST

## (undated) DEVICE — SUT VCRL + 2-0 27IN CT2 VIO --

## (undated) DEVICE — SOL IRRIGATION INJ NACL 0.9% 500ML BTL

## (undated) DEVICE — REM POLYHESIVE ADULT PATIENT RETURN ELECTRODE: Brand: VALLEYLAB

## (undated) DEVICE — SOL IRR STRL H2O 1500ML BTL --

## (undated) DEVICE — STERILE TETRA-FLEX CF LF, 6IN X 11 YD: Brand: TETRA-FLEX™ CF

## (undated) DEVICE — SYR LR LCK 1ML GRAD NSAF 30ML --

## (undated) DEVICE — DRSG FOAM MEPILX PST OP 4X12IN --

## (undated) DEVICE — SOLUTION IRRIG 3000ML LAC R FLX CONT

## (undated) DEVICE — OSCILLATING TIP SAW CARTRIDGE: Brand: PRECISION FALCON

## (undated) DEVICE — KENDALL SCD EXPRESS FOOT CUFF, MEDIUM: Brand: KENDALL SCD

## (undated) DEVICE — NEEDLE HYPO 21GA L1.5IN INTRAMUSCULAR S STL LATCH BVL UP

## (undated) DEVICE — SUT VCRL + 1 36IN CT1 VIO --

## (undated) DEVICE — DISPOSABLE TOURNIQUET CUFF SINGLE BLADDER, SINGLE PORT AND QUICK CONNECT CONNECTOR: Brand: COLOR CUFF

## (undated) DEVICE — SUTURE MCRYL SZ 3-0 L27IN ABSRB UD L24MM PS-1 3/8 CIR PRIM Y936H

## (undated) DEVICE — ADHESIVE SKIN CLOSURE 4X22 CM PREMIERPRO EXOFINFUSION DISP

## (undated) DEVICE — (D)PREP SKN CHLRAPRP APPL 26ML -- CONVERT TO ITEM 371833